# Patient Record
Sex: MALE | Race: BLACK OR AFRICAN AMERICAN | Employment: FULL TIME | URBAN - METROPOLITAN AREA
[De-identification: names, ages, dates, MRNs, and addresses within clinical notes are randomized per-mention and may not be internally consistent; named-entity substitution may affect disease eponyms.]

---

## 2017-01-06 ENCOUNTER — TELEPHONE (OUTPATIENT)
Dept: FAMILY MEDICINE CLINIC | Facility: CLINIC | Age: 57
End: 2017-01-06

## 2017-12-20 ENCOUNTER — APPOINTMENT (OUTPATIENT)
Dept: LAB | Age: 57
End: 2017-12-20
Attending: FAMILY MEDICINE
Payer: COMMERCIAL

## 2017-12-20 ENCOUNTER — OFFICE VISIT (OUTPATIENT)
Dept: FAMILY MEDICINE CLINIC | Facility: CLINIC | Age: 57
End: 2017-12-20

## 2017-12-20 VITALS
SYSTOLIC BLOOD PRESSURE: 136 MMHG | DIASTOLIC BLOOD PRESSURE: 88 MMHG | HEIGHT: 69 IN | BODY MASS INDEX: 28.44 KG/M2 | RESPIRATION RATE: 16 BRPM | TEMPERATURE: 99 F | WEIGHT: 192 LBS | HEART RATE: 78 BPM

## 2017-12-20 DIAGNOSIS — Z00.00 BLOOD TESTS FOR ROUTINE GENERAL PHYSICAL EXAMINATION: ICD-10-CM

## 2017-12-20 DIAGNOSIS — M65.332 TRIGGER MIDDLE FINGER OF LEFT HAND: ICD-10-CM

## 2017-12-20 DIAGNOSIS — N52.9 ERECTILE DYSFUNCTION, UNSPECIFIED ERECTILE DYSFUNCTION TYPE: ICD-10-CM

## 2017-12-20 DIAGNOSIS — Z12.11 SCREEN FOR COLON CANCER: ICD-10-CM

## 2017-12-20 DIAGNOSIS — Z00.00 ROUTINE GENERAL MEDICAL EXAMINATION AT A HEALTH CARE FACILITY: Primary | ICD-10-CM

## 2017-12-20 PROCEDURE — 86431 RHEUMATOID FACTOR QUANT: CPT | Performed by: FAMILY MEDICINE

## 2017-12-20 PROCEDURE — 80061 LIPID PANEL: CPT | Performed by: FAMILY MEDICINE

## 2017-12-20 PROCEDURE — 99396 PREV VISIT EST AGE 40-64: CPT | Performed by: FAMILY MEDICINE

## 2017-12-20 PROCEDURE — 36415 COLL VENOUS BLD VENIPUNCTURE: CPT | Performed by: FAMILY MEDICINE

## 2017-12-20 PROCEDURE — 80053 COMPREHEN METABOLIC PANEL: CPT | Performed by: FAMILY MEDICINE

## 2017-12-20 PROCEDURE — 84153 ASSAY OF PSA TOTAL: CPT | Performed by: FAMILY MEDICINE

## 2017-12-20 PROCEDURE — 86140 C-REACTIVE PROTEIN: CPT | Performed by: FAMILY MEDICINE

## 2017-12-20 RX ORDER — SILDENAFIL 25 MG/1
25 TABLET, FILM COATED ORAL
Qty: 8 TABLET | Refills: 12 | Status: SHIPPED | OUTPATIENT
Start: 2017-12-20 | End: 2018-08-16 | Stop reason: ALTCHOICE

## 2017-12-20 NOTE — PROGRESS NOTES
HPI:  Here for a physical.    PAST MEDICAL HISTORY:  No past medical history on file. PAST SURGICAL HISTORY:  No past surgical history on file.   MEDICATIONS:    Current Outpatient Prescriptions:  Sildenafil Citrate 20 MG Oral Tab Take 1 tablet (20 mg tota of symptoms of depression or anxiety. HEMATOLOGY: No complaints of bruising or excessive bleeding. ENDOCRINE: No complaints of excessive thirst or urination; No complaints of unexpected weight gain or weight loss.   SKIN: no new or changing moles reported Alert and orientated. SKIN: no suspicions lesions noted. PSYCHIATRIC: Mood and affect appropriate. ASSESSMENT / PLAN:  Routine health maintenance. Labs ordered: see orders.   Discussed USPTFS recommendations against prostate cancer screening as of M

## 2017-12-26 ENCOUNTER — TELEPHONE (OUTPATIENT)
Dept: FAMILY MEDICINE CLINIC | Facility: CLINIC | Age: 57
End: 2017-12-26

## 2017-12-27 RX ORDER — SILDENAFIL CITRATE 20 MG/1
TABLET ORAL
Qty: 8 TABLET | Refills: 0 | OUTPATIENT
Start: 2017-12-27

## 2018-01-02 RX ORDER — SILDENAFIL CITRATE 20 MG/1
TABLET ORAL
Qty: 8 TABLET | Refills: 0 | OUTPATIENT
Start: 2018-01-02

## 2018-01-10 NOTE — TELEPHONE ENCOUNTER
P.A. Done through CoverMyMeds. States no product available for selection. Additional info required. Hold for fax.

## 2018-01-11 NOTE — TELEPHONE ENCOUNTER
Spoke to pharmacy - tried twice to do P. A. -response was no products available, case cannot be created- denied.

## 2018-08-16 ENCOUNTER — OFFICE VISIT (OUTPATIENT)
Dept: FAMILY MEDICINE CLINIC | Facility: CLINIC | Age: 58
End: 2018-08-16
Payer: COMMERCIAL

## 2018-08-16 VITALS
WEIGHT: 189 LBS | DIASTOLIC BLOOD PRESSURE: 84 MMHG | TEMPERATURE: 98 F | OXYGEN SATURATION: 98 % | HEIGHT: 69 IN | BODY MASS INDEX: 27.99 KG/M2 | HEART RATE: 93 BPM | RESPIRATION RATE: 18 BRPM | SYSTOLIC BLOOD PRESSURE: 134 MMHG

## 2018-08-16 DIAGNOSIS — Z12.11 SCREEN FOR COLON CANCER: ICD-10-CM

## 2018-08-16 DIAGNOSIS — K22.4 ESOPHAGEAL DYSMOTILITY: Primary | ICD-10-CM

## 2018-08-16 PROCEDURE — 99213 OFFICE O/P EST LOW 20 MIN: CPT | Performed by: FAMILY MEDICINE

## 2018-08-16 NOTE — PROGRESS NOTES
Here with an episode of food getting stuck in his chest 2 weeks ago. He was eating a steak, baked potatoes, and asparagus. The food seemed to be stuck low in his chest.  Made him feel uncomfortable. He went and laid down on the couch for a while.   Event

## 2018-09-06 PROBLEM — R13.19 ESOPHAGEAL DYSPHAGIA: Status: ACTIVE | Noted: 2018-09-06

## 2018-10-17 PROBLEM — Z12.11 SPECIAL SCREENING FOR MALIGNANT NEOPLASMS, COLON: Status: ACTIVE | Noted: 2018-10-17

## 2019-01-22 ENCOUNTER — OFFICE VISIT (OUTPATIENT)
Dept: FAMILY MEDICINE CLINIC | Facility: CLINIC | Age: 59
End: 2019-01-22
Payer: COMMERCIAL

## 2019-01-22 ENCOUNTER — LABORATORY ENCOUNTER (OUTPATIENT)
Dept: LAB | Age: 59
End: 2019-01-22
Attending: FAMILY MEDICINE
Payer: COMMERCIAL

## 2019-01-22 VITALS
SYSTOLIC BLOOD PRESSURE: 120 MMHG | WEIGHT: 195 LBS | OXYGEN SATURATION: 98 % | TEMPERATURE: 98 F | DIASTOLIC BLOOD PRESSURE: 80 MMHG | BODY MASS INDEX: 27.92 KG/M2 | RESPIRATION RATE: 18 BRPM | HEIGHT: 70 IN | HEART RATE: 67 BPM

## 2019-01-22 DIAGNOSIS — N40.1 BENIGN PROSTATIC HYPERPLASIA WITH URINARY FREQUENCY: ICD-10-CM

## 2019-01-22 DIAGNOSIS — Z13.0 SCREENING, ANEMIA, DEFICIENCY, IRON: ICD-10-CM

## 2019-01-22 DIAGNOSIS — Z13.21 ENCOUNTER FOR VITAMIN DEFICIENCY SCREENING: ICD-10-CM

## 2019-01-22 DIAGNOSIS — R35.0 URINE FREQUENCY: ICD-10-CM

## 2019-01-22 DIAGNOSIS — R35.0 BENIGN PROSTATIC HYPERPLASIA WITH URINARY FREQUENCY: Primary | ICD-10-CM

## 2019-01-22 DIAGNOSIS — N40.1 BENIGN PROSTATIC HYPERPLASIA WITH URINARY FREQUENCY: Primary | ICD-10-CM

## 2019-01-22 DIAGNOSIS — Z13.220 SCREENING, LIPID: ICD-10-CM

## 2019-01-22 DIAGNOSIS — R35.0 BENIGN PROSTATIC HYPERPLASIA WITH URINARY FREQUENCY: ICD-10-CM

## 2019-01-22 LAB
ALBUMIN SERPL-MCNC: 3.8 G/DL (ref 3.1–4.5)
ALBUMIN/GLOB SERPL: 1 {RATIO} (ref 1–2)
ALP LIVER SERPL-CCNC: 66 U/L (ref 45–117)
ALT SERPL-CCNC: 59 U/L (ref 17–63)
ANION GAP SERPL CALC-SCNC: 9 MMOL/L (ref 0–18)
AST SERPL-CCNC: 29 U/L (ref 15–41)
BASOPHILS # BLD AUTO: 0.03 X10(3) UL (ref 0–0.1)
BASOPHILS NFR BLD AUTO: 0.6 %
BILIRUB SERPL-MCNC: 0.4 MG/DL (ref 0.1–2)
BUN BLD-MCNC: 21 MG/DL (ref 8–20)
BUN/CREAT SERPL: 15.3 (ref 10–20)
CALCIUM BLD-MCNC: 9.2 MG/DL (ref 8.3–10.3)
CHLORIDE SERPL-SCNC: 110 MMOL/L (ref 101–111)
CHOLEST SMN-MCNC: 244 MG/DL (ref ?–200)
CO2 SERPL-SCNC: 24 MMOL/L (ref 22–32)
CREAT BLD-MCNC: 1.37 MG/DL (ref 0.7–1.3)
EOSINOPHIL # BLD AUTO: 0.23 X10(3) UL (ref 0–0.3)
EOSINOPHIL NFR BLD AUTO: 4.7 %
ERYTHROCYTE [DISTWIDTH] IN BLOOD BY AUTOMATED COUNT: 14.8 % (ref 11.5–16)
EST. AVERAGE GLUCOSE BLD GHB EST-MCNC: 137 MG/DL (ref 68–126)
GLOBULIN PLAS-MCNC: 3.7 G/DL (ref 2.8–4.4)
GLUCOSE BLD-MCNC: 109 MG/DL (ref 70–99)
HBA1C MFR BLD HPLC: 6.4 % (ref ?–5.7)
HCT VFR BLD AUTO: 46.2 % (ref 37–53)
HDLC SERPL-MCNC: 43 MG/DL (ref 40–59)
HGB BLD-MCNC: 14.9 G/DL (ref 13–17)
IMMATURE GRANULOCYTE COUNT: 0.02 X10(3) UL (ref 0–1)
IMMATURE GRANULOCYTE RATIO %: 0.4 %
LDLC SERPL CALC-MCNC: 137 MG/DL (ref ?–100)
LYMPHOCYTES # BLD AUTO: 1.8 X10(3) UL (ref 0.9–4)
LYMPHOCYTES NFR BLD AUTO: 36.7 %
M PROTEIN MFR SERPL ELPH: 7.5 G/DL (ref 6.4–8.2)
MCH RBC QN AUTO: 26.9 PG (ref 27–33.2)
MCHC RBC AUTO-ENTMCNC: 32.3 G/DL (ref 31–37)
MCV RBC AUTO: 83.4 FL (ref 80–99)
MONOCYTES # BLD AUTO: 0.54 X10(3) UL (ref 0.1–1)
MONOCYTES NFR BLD AUTO: 11 %
NEUTROPHIL ABS PRELIM: 2.29 X10 (3) UL (ref 1.3–6.7)
NEUTROPHILS # BLD AUTO: 2.29 X10(3) UL (ref 1.3–6.7)
NEUTROPHILS NFR BLD AUTO: 46.6 %
NONHDLC SERPL-MCNC: 201 MG/DL (ref ?–130)
OSMOLALITY SERPL CALC.SUM OF ELEC: 300 MOSM/KG (ref 275–295)
PLATELET # BLD AUTO: 246 10(3)UL (ref 150–450)
POTASSIUM SERPL-SCNC: 4.2 MMOL/L (ref 3.6–5.1)
PSA SERPL-MCNC: 1.52 NG/ML (ref 0.01–4)
RBC # BLD AUTO: 5.54 X10(6)UL (ref 4.3–5.7)
RED CELL DISTRIBUTION WIDTH-SD: 45 FL (ref 35.1–46.3)
SODIUM SERPL-SCNC: 143 MMOL/L (ref 136–144)
TRIGL SERPL-MCNC: 318 MG/DL (ref 30–149)
VIT D+METAB SERPL-MCNC: 69.7 NG/ML (ref 30–100)
VLDLC SERPL CALC-MCNC: 64 MG/DL (ref 0–30)
WBC # BLD AUTO: 4.9 X10(3) UL (ref 4–13)

## 2019-01-22 PROCEDURE — 85025 COMPLETE CBC W/AUTO DIFF WBC: CPT | Performed by: FAMILY MEDICINE

## 2019-01-22 PROCEDURE — 36415 COLL VENOUS BLD VENIPUNCTURE: CPT | Performed by: FAMILY MEDICINE

## 2019-01-22 PROCEDURE — 80061 LIPID PANEL: CPT | Performed by: FAMILY MEDICINE

## 2019-01-22 PROCEDURE — 83036 HEMOGLOBIN GLYCOSYLATED A1C: CPT | Performed by: FAMILY MEDICINE

## 2019-01-22 PROCEDURE — 99213 OFFICE O/P EST LOW 20 MIN: CPT | Performed by: FAMILY MEDICINE

## 2019-01-22 PROCEDURE — 84153 ASSAY OF PSA TOTAL: CPT | Performed by: FAMILY MEDICINE

## 2019-01-22 PROCEDURE — 80053 COMPREHEN METABOLIC PANEL: CPT | Performed by: FAMILY MEDICINE

## 2019-01-22 PROCEDURE — 82306 VITAMIN D 25 HYDROXY: CPT | Performed by: FAMILY MEDICINE

## 2019-01-22 RX ORDER — TAMSULOSIN HYDROCHLORIDE 0.4 MG/1
0.4 CAPSULE ORAL DAILY
Qty: 30 CAPSULE | Refills: 11 | Status: SHIPPED | OUTPATIENT
Start: 2019-01-22 | End: 2019-04-17 | Stop reason: ALTCHOICE

## 2019-01-22 NOTE — PROGRESS NOTES
Here with 2 months of urine frequency and nocturia. Sometimes a bit of urgency but not always. No blood in the urine. No pain in the lower abdomen. No flank pain. No fevers or chills.     PAST MEDICAL HISTORY:  Past Medical History:   Diagnosis Date

## 2019-01-23 ENCOUNTER — TELEPHONE (OUTPATIENT)
Dept: FAMILY MEDICINE CLINIC | Facility: CLINIC | Age: 59
End: 2019-01-23

## 2019-01-23 NOTE — TELEPHONE ENCOUNTER
----- Message from Kendrick De La Rosa MD sent at 1/23/2019 12:11 PM CST -----  3-month sugar test consistent with new onset diabetes. This is contributing to his urine frequency. I thought his prostate gland felt a little exam enlarged on exam.  Given this finding on the labs and a normal PSA, I would like him to start metformin 500 mg daily. Follow-up for discussion of diabetes in about 1 month. This will give the medicine some time to work and see if his urine frequency improves.

## 2019-02-12 ENCOUNTER — TELEPHONE (OUTPATIENT)
Dept: FAMILY MEDICINE CLINIC | Facility: CLINIC | Age: 59
End: 2019-02-12

## 2019-02-12 ENCOUNTER — OFFICE VISIT (OUTPATIENT)
Dept: FAMILY MEDICINE CLINIC | Facility: CLINIC | Age: 59
End: 2019-02-12
Payer: COMMERCIAL

## 2019-02-12 VITALS
HEIGHT: 70 IN | DIASTOLIC BLOOD PRESSURE: 88 MMHG | OXYGEN SATURATION: 98 % | TEMPERATURE: 98 F | HEART RATE: 111 BPM | SYSTOLIC BLOOD PRESSURE: 130 MMHG | BODY MASS INDEX: 27.2 KG/M2 | WEIGHT: 190 LBS | RESPIRATION RATE: 18 BRPM

## 2019-02-12 DIAGNOSIS — N52.9 ERECTILE DYSFUNCTION, UNSPECIFIED ERECTILE DYSFUNCTION TYPE: ICD-10-CM

## 2019-02-12 DIAGNOSIS — Z00.00 ROUTINE GENERAL MEDICAL EXAMINATION AT A HEALTH CARE FACILITY: Primary | ICD-10-CM

## 2019-02-12 DIAGNOSIS — E11.9 TYPE 2 DIABETES MELLITUS WITHOUT COMPLICATION, WITHOUT LONG-TERM CURRENT USE OF INSULIN (HCC): ICD-10-CM

## 2019-02-12 PROCEDURE — 99396 PREV VISIT EST AGE 40-64: CPT | Performed by: FAMILY MEDICINE

## 2019-02-12 RX ORDER — SILDENAFIL 100 MG/1
100 TABLET, FILM COATED ORAL
Qty: 1 TABLET | Refills: 5 | Status: SHIPPED | OUTPATIENT
Start: 2019-02-12 | End: 2019-05-31

## 2019-02-12 NOTE — PROGRESS NOTES
Here for a physical.  We have recently diagnosed him with diabetes based on urine frequency and hemoglobin A1c of 6.4. He has been started on metformin 500 mg once a day. Urine frequency is improving.   He is leaving for [facility] in tomorrow for his wedd complaints of diplopia or blurred vision. EARS: No complaints of tinnitus or decreased hearing acuity. CARDIOVASCULAR: No complaints of chest pain with exertion, no PND, orthopnea, or edema. No decrease in exercise tolerance.   PULMONARY: No complaints of extremities. No JVD noted. PULMONARY: CTA bilaterally, good air exchange, no rhonchi, wheezes, or rales. No dullness to percussion. ABDOMEN: Soft, nontender, nondistended, NABS x 4 quadrants. No HSM; no masses; no bruits.    LYMPHATIC: no lymphadenopathy are quite expensive for him. Patient understood above plan and agreed to do labs within the next 30 days as well as to make all appointments for referrals if given within the next 30 days. Patient understands to contact office if unable to do so.

## 2019-02-12 NOTE — PATIENT INSTRUCTIONS
Statin med to reduce triglycerides and protect you from heart attacks and strokes    Ace inhibitor to protect your kidneys from elevated sugars and prevent kidney failure/ dialysis    Repeat labs in 3 months and followup a few days later.     One test is ur

## 2019-02-12 NOTE — TELEPHONE ENCOUNTER
Pt is requesting a copy of his A1C lab results or all the results that were discussed today with Dr. Hilda Zapien. Please call and advise.

## 2019-04-17 ENCOUNTER — APPOINTMENT (OUTPATIENT)
Dept: LAB | Age: 59
End: 2019-04-17
Attending: FAMILY MEDICINE
Payer: COMMERCIAL

## 2019-04-17 ENCOUNTER — OFFICE VISIT (OUTPATIENT)
Dept: FAMILY MEDICINE CLINIC | Facility: CLINIC | Age: 59
End: 2019-04-17
Payer: COMMERCIAL

## 2019-04-17 VITALS
TEMPERATURE: 98 F | DIASTOLIC BLOOD PRESSURE: 90 MMHG | HEIGHT: 70 IN | WEIGHT: 188 LBS | HEART RATE: 78 BPM | BODY MASS INDEX: 26.92 KG/M2 | RESPIRATION RATE: 18 BRPM | OXYGEN SATURATION: 98 % | SYSTOLIC BLOOD PRESSURE: 160 MMHG

## 2019-04-17 DIAGNOSIS — E11.9 TYPE 2 DIABETES MELLITUS WITHOUT COMPLICATION, WITHOUT LONG-TERM CURRENT USE OF INSULIN (HCC): Primary | ICD-10-CM

## 2019-04-17 DIAGNOSIS — E11.9 TYPE 2 DIABETES MELLITUS WITHOUT COMPLICATION, WITHOUT LONG-TERM CURRENT USE OF INSULIN (HCC): ICD-10-CM

## 2019-04-17 PROCEDURE — 82043 UR ALBUMIN QUANTITATIVE: CPT | Performed by: FAMILY MEDICINE

## 2019-04-17 PROCEDURE — 36415 COLL VENOUS BLD VENIPUNCTURE: CPT | Performed by: FAMILY MEDICINE

## 2019-04-17 PROCEDURE — 83036 HEMOGLOBIN GLYCOSYLATED A1C: CPT | Performed by: FAMILY MEDICINE

## 2019-04-17 PROCEDURE — 99213 OFFICE O/P EST LOW 20 MIN: CPT | Performed by: FAMILY MEDICINE

## 2019-04-17 PROCEDURE — 80061 LIPID PANEL: CPT | Performed by: FAMILY MEDICINE

## 2019-04-17 PROCEDURE — 80053 COMPREHEN METABOLIC PANEL: CPT | Performed by: FAMILY MEDICINE

## 2019-04-17 PROCEDURE — 82570 ASSAY OF URINE CREATININE: CPT | Performed by: FAMILY MEDICINE

## 2019-04-17 RX ORDER — LISINOPRIL 5 MG/1
5 TABLET ORAL DAILY
Qty: 90 TABLET | Refills: 3 | Status: SHIPPED | OUTPATIENT
Start: 2019-04-17 | End: 2019-05-17

## 2019-04-17 RX ORDER — ATORVASTATIN CALCIUM 20 MG/1
20 TABLET, FILM COATED ORAL NIGHTLY
Qty: 90 TABLET | Refills: 3 | Status: SHIPPED | OUTPATIENT
Start: 2019-04-17 | End: 2020-02-28

## 2019-04-17 NOTE — PROGRESS NOTES
Patient here to follow-up on new onset type 2 diabetes. Patient was having urine frequency. Trial of tamsulosin did not help. Laboratories revealed hemoglobin A1c of 6.4. He has been on metformin for 1 month. Urine frequency has improved.   No complain monofilament/sensation of both feet is normal.  Pulsation pedal pulse exam of both lower legs/feet is normal as well.     Assessment type 2 diabetes new onset with urine frequency, urine frequency improved with metformin    Elevated blood pressure, repeat b

## 2019-04-17 NOTE — PATIENT INSTRUCTIONS
Diet: Diabetes  Food is an important tool that you can use to control diabetes and stay healthy. Eating well-balanced meals in the correct amounts will help you control your blood glucose levels and prevent low blood sugar reactions.  It will also help yo · Avoid added salt. It can contribute to high blood pressure, which can cause heart disease. People with diabetes already have a risk of high blood pressure and heart disease. · Stay at a healthy weight.  If you need to lose weight, cut down on your portio · Fiber is found in foods such as vegetables, fruits, beans, and whole grains. Unlike other carbs, fiber isn’t digested or absorbed. So it doesn’t raise blood sugar.  In fact, fiber can help keep blood sugar from rising too fast. It also helps keep blood ch Protein helps the body build and repair muscle and other tissue. Protein has little or no effect on blood sugar. However, many foods that contain protein also contain saturated fat.  By choosing low-fat protein sources, you can get the benefits of protein w You will be asked about your overall health and any history of foot problems. You’ll also discuss your diabetes history, such as whether your blood sugar level has changed over time.  It also includes questions about sensations of pain, tingling, pins and n When you have diabetes, it’s easier to prevent problems than to treat them later on. So see your healthcare team for regular checkups and foot care. Your healthcare team can also help you learn more about caring for your feet at home.  For example, you may When you have diabetes, the glucose in your blood builds up because it cannot get into the cells. This buildup is called high blood sugar (hyperglycemia). Your blood sugar level depends on several things.  It depends on what kind of food you eat and how mu · Take good care of your feet. If you have lost feeling in your feet, you may not see an injury or infection. Check your feet and between your toes at least once a week.   · Wear a medical alert bracelet or necklace, or carry a card in your wallet that says · Drink water or other liquids that do not have caffeine or calories. This will keep you from getting dehydrated. If you are nauseated or vomiting, takes small sips every 5 minutes.  To prevent dehydration try to drink a cup (8 ounces) of fluids every hour · Chest pain or shortness of breath  · Dizziness or fainting  · Weakness of an arm or leg or one side of the face  · Trouble speaking or seeing   Date Last Reviewed: 6/1/2016  © 6507-9094 [employer].  [address] [facility] [place], [address] [place] [address]

## 2019-05-17 ENCOUNTER — OFFICE VISIT (OUTPATIENT)
Dept: FAMILY MEDICINE CLINIC | Facility: CLINIC | Age: 59
End: 2019-05-17
Payer: COMMERCIAL

## 2019-05-17 VITALS
BODY MASS INDEX: 27.43 KG/M2 | WEIGHT: 191.63 LBS | HEART RATE: 83 BPM | TEMPERATURE: 98 F | SYSTOLIC BLOOD PRESSURE: 124 MMHG | OXYGEN SATURATION: 96 % | RESPIRATION RATE: 18 BRPM | HEIGHT: 70 IN | DIASTOLIC BLOOD PRESSURE: 84 MMHG

## 2019-05-17 DIAGNOSIS — T46.4X5A ACE-INHIBITOR COUGH: Primary | ICD-10-CM

## 2019-05-17 DIAGNOSIS — R05.8 ACE-INHIBITOR COUGH: Primary | ICD-10-CM

## 2019-05-17 PROCEDURE — 99213 OFFICE O/P EST LOW 20 MIN: CPT | Performed by: FAMILY MEDICINE

## 2019-05-17 RX ORDER — BENZONATATE 100 MG/1
100 CAPSULE ORAL 3 TIMES DAILY PRN
Qty: 30 CAPSULE | Refills: 0 | Status: SHIPPED | OUTPATIENT
Start: 2019-05-17 | End: 2019-10-14 | Stop reason: ALTCHOICE

## 2019-05-17 NOTE — PATIENT INSTRUCTIONS
Stop lisinopril due to cough. Cough should resolve in another week or two. Message me next Friday with an update on cough . We may need to try a different family of bp meds in the future.

## 2019-05-17 NOTE — PROGRESS NOTES
Started on lisinopril and statin 1 month ago due to new onset diabetes. Developed a cough almost immediately. Feels like he has to clear his throat. No fevers or chills. No head congestion. No sore throat.   Rarely he gets a little bit of mucus from th are clear no crackles nor wheezes. No dullness to percussion. No E to a changes. Heart regular rate and rhythm normal S1-S2 no murmur. Assessment ACE inhibitor induced cough    Plans continue off ACE inhibitor.   I will hold off on resuming any other

## 2019-05-31 DIAGNOSIS — N52.9 ERECTILE DYSFUNCTION, UNSPECIFIED ERECTILE DYSFUNCTION TYPE: ICD-10-CM

## 2019-06-02 RX ORDER — SILDENAFIL 100 MG/1
100 TABLET, FILM COATED ORAL
Qty: 1 TABLET | Refills: 5 | Status: SHIPPED | OUTPATIENT
Start: 2019-06-02 | End: 2019-08-26

## 2019-08-26 DIAGNOSIS — N52.9 ERECTILE DYSFUNCTION, UNSPECIFIED ERECTILE DYSFUNCTION TYPE: ICD-10-CM

## 2019-08-26 RX ORDER — SILDENAFIL 100 MG/1
100 TABLET, FILM COATED ORAL
Qty: 1 TABLET | Refills: 5 | Status: SHIPPED | OUTPATIENT
Start: 2019-08-26 | End: 2021-02-18

## 2019-08-26 NOTE — TELEPHONE ENCOUNTER
Pt stated he took his last pill of the sildenafil and is also out of refills. Pt is requesting a refill. Please call and advise.

## 2019-10-14 ENCOUNTER — HOSPITAL ENCOUNTER (OUTPATIENT)
Dept: GENERAL RADIOLOGY | Age: 59
Discharge: HOME | End: 2019-10-14
Attending: FAMILY MEDICINE
Payer: COMMERCIAL

## 2019-10-14 ENCOUNTER — OFFICE VISIT (OUTPATIENT)
Dept: FAMILY MEDICINE CLINIC | Facility: CLINIC | Age: 59
End: 2019-10-14
Payer: COMMERCIAL

## 2019-10-14 VITALS
DIASTOLIC BLOOD PRESSURE: 80 MMHG | HEIGHT: 70 IN | SYSTOLIC BLOOD PRESSURE: 116 MMHG | RESPIRATION RATE: 13 BRPM | HEART RATE: 78 BPM | OXYGEN SATURATION: 97 % | BODY MASS INDEX: 27.11 KG/M2 | TEMPERATURE: 98 F | WEIGHT: 189.38 LBS

## 2019-10-14 DIAGNOSIS — R35.0 BENIGN PROSTATIC HYPERPLASIA WITH URINARY FREQUENCY: ICD-10-CM

## 2019-10-14 DIAGNOSIS — R35.0 URINE FREQUENCY: ICD-10-CM

## 2019-10-14 DIAGNOSIS — E11.9 TYPE 2 DIABETES MELLITUS WITHOUT COMPLICATION, WITHOUT LONG-TERM CURRENT USE OF INSULIN (HCC): Primary | ICD-10-CM

## 2019-10-14 DIAGNOSIS — R05.8 COUGH WITH SPUTUM: ICD-10-CM

## 2019-10-14 DIAGNOSIS — N40.1 BENIGN PROSTATIC HYPERPLASIA WITH URINARY FREQUENCY: ICD-10-CM

## 2019-10-14 PROCEDURE — 71046 X-RAY EXAM CHEST 2 VIEWS: CPT | Performed by: FAMILY MEDICINE

## 2019-10-14 PROCEDURE — 99214 OFFICE O/P EST MOD 30 MIN: CPT | Performed by: FAMILY MEDICINE

## 2019-10-14 PROCEDURE — 81003 URINALYSIS AUTO W/O SCOPE: CPT | Performed by: FAMILY MEDICINE

## 2019-10-14 RX ORDER — FINASTERIDE 5 MG/1
5 TABLET, FILM COATED ORAL DAILY
Qty: 90 TABLET | Refills: 0 | Status: SHIPPED | OUTPATIENT
Start: 2019-10-14 | End: 2019-11-25 | Stop reason: DRUGHIGH

## 2019-10-14 NOTE — PROGRESS NOTES
Here with cough going on the better part of a month. Productive of white mucus without blood. No chest pain or back pain. No fevers or chills. No head congestion. No new pets in the house. They did put new carpeting down over the summer.     Due for d Abdomen soft nontender positive bowel sounds no suprapubic tenderness no inguinal lymphadenopathy. Back no CVA tenderness. Extremities 2+ pulses and no edema. Urine Analysis normal except for elevated specific gravity.     Assessment #1 type 2 diabetes

## 2019-10-14 NOTE — PATIENT INSTRUCTIONS
Use Proscar for 90 days. This will help to shrink the prostate gland. Then follow-up with me to discuss the urine.

## 2019-11-25 ENCOUNTER — TELEPHONE (OUTPATIENT)
Dept: FAMILY MEDICINE CLINIC | Facility: CLINIC | Age: 59
End: 2019-11-25

## 2019-11-25 RX ORDER — FINASTERIDE 5 MG/1
10 TABLET, FILM COATED ORAL DAILY
Qty: 60 TABLET | Refills: 0 | Status: SHIPPED | OUTPATIENT
Start: 2019-11-25 | End: 2020-02-28 | Stop reason: ALTCHOICE

## 2019-11-25 NOTE — TELEPHONE ENCOUNTER
Pt stated he was prescribed medication for his frequent urination, pt finished the entire bottle and the med did not help, pt is still having frequent urination. Should pt start a new med? Please call and advise. Pt asked to call him at his cell phone.

## 2019-11-25 NOTE — TELEPHONE ENCOUNTER
Pt was given finasteride 5mg for freq urination,  It has not helped him. Is there something else or higher dose?

## 2020-02-28 ENCOUNTER — OFFICE VISIT (OUTPATIENT)
Dept: FAMILY MEDICINE CLINIC | Facility: CLINIC | Age: 60
End: 2020-02-28
Payer: COMMERCIAL

## 2020-02-28 VITALS
WEIGHT: 194.19 LBS | TEMPERATURE: 98 F | BODY MASS INDEX: 27.8 KG/M2 | SYSTOLIC BLOOD PRESSURE: 136 MMHG | DIASTOLIC BLOOD PRESSURE: 78 MMHG | HEIGHT: 70 IN | HEART RATE: 76 BPM

## 2020-02-28 DIAGNOSIS — E11.9 TYPE 2 DIABETES MELLITUS WITHOUT COMPLICATION, WITHOUT LONG-TERM CURRENT USE OF INSULIN (HCC): Primary | ICD-10-CM

## 2020-02-28 PROBLEM — Z12.11 SPECIAL SCREENING FOR MALIGNANT NEOPLASMS, COLON: Status: RESOLVED | Noted: 2018-10-17 | Resolved: 2020-02-28

## 2020-02-28 PROCEDURE — 99213 OFFICE O/P EST LOW 20 MIN: CPT | Performed by: FAMILY MEDICINE

## 2020-02-28 NOTE — PROGRESS NOTES
Here to follow-up on type 2 diabetes. He stopped all the medications 1 month ago. He was having acid heartburn as well as diarrhea. He has been doing better off of the medications as far as those symptoms are concerned.   He felt that there were just too well.    Assessment type 2 diabetes off medications for 1 month. Plan we discussed that his A1c was 6.4 last year when he was symptomatic with urine frequency. Some patients are able to control her diabetes with diet and exercise.   The side effects he

## 2020-02-28 NOTE — PATIENT INSTRUCTIONS
Metformin caused gi side effects. All diabetes should be on cholesterol meds  Repeat labs in late April, off meds for 3 total months. If urine frequency returns, that is your clue that sugar is going up.   Weight goal 175:  BMI 25

## 2020-04-27 DIAGNOSIS — E78.00 PURE HYPERCHOLESTEROLEMIA: ICD-10-CM

## 2020-04-27 DIAGNOSIS — E11.9 TYPE 2 DIABETES MELLITUS WITHOUT COMPLICATION, WITHOUT LONG-TERM CURRENT USE OF INSULIN (HCC): Primary | ICD-10-CM

## 2020-04-27 RX ORDER — ATORVASTATIN CALCIUM 20 MG/1
20 TABLET, FILM COATED ORAL NIGHTLY
Qty: 90 TABLET | Refills: 3 | Status: SHIPPED | OUTPATIENT
Start: 2020-04-27 | End: 2020-06-17

## 2020-06-08 ENCOUNTER — OFFICE VISIT (OUTPATIENT)
Dept: FAMILY MEDICINE CLINIC | Facility: CLINIC | Age: 60
End: 2020-06-08
Payer: COMMERCIAL

## 2020-06-08 VITALS
SYSTOLIC BLOOD PRESSURE: 142 MMHG | HEART RATE: 97 BPM | TEMPERATURE: 97 F | RESPIRATION RATE: 18 BRPM | DIASTOLIC BLOOD PRESSURE: 72 MMHG | HEIGHT: 68.5 IN | WEIGHT: 194 LBS | OXYGEN SATURATION: 98 % | BODY MASS INDEX: 29.07 KG/M2

## 2020-06-08 DIAGNOSIS — R35.0 BENIGN PROSTATIC HYPERPLASIA WITH URINARY FREQUENCY: ICD-10-CM

## 2020-06-08 DIAGNOSIS — E78.00 PURE HYPERCHOLESTEROLEMIA: ICD-10-CM

## 2020-06-08 DIAGNOSIS — E11.9 TYPE 2 DIABETES MELLITUS WITHOUT COMPLICATION, WITHOUT LONG-TERM CURRENT USE OF INSULIN (HCC): Primary | ICD-10-CM

## 2020-06-08 DIAGNOSIS — N40.1 BENIGN PROSTATIC HYPERPLASIA WITH URINARY FREQUENCY: ICD-10-CM

## 2020-06-08 PROCEDURE — 99396 PREV VISIT EST AGE 40-64: CPT | Performed by: FAMILY MEDICINE

## 2020-06-08 RX ORDER — TERAZOSIN 2 MG/1
2 CAPSULE ORAL NIGHTLY
Qty: 90 CAPSULE | Refills: 3 | Status: SHIPPED | OUTPATIENT
Start: 2020-06-08 | End: 2020-09-15 | Stop reason: ALTCHOICE

## 2020-06-08 RX ORDER — LEVOTHYROXINE, LIOTHYRONINE 38; 9 UG/1; UG/1
TABLET ORAL
COMMUNITY
Start: 2020-04-21

## 2020-06-08 NOTE — PROGRESS NOTES
HPI:  Here for a physical.    PAST MEDICAL HISTORY:  Past Medical History:   Diagnosis Date   • Decorative tattoo    • Type 2 diabetes mellitus without complication, without long-term current use of insulin ([facility] 2/12/2019   • Wears glasses      PAST SURGI complaints of dysphgia or any GERD symptoms. Denies hematochezia and melena. No complaints of any new constipation or diarrhea. No complaints of abdominal pain or cramping. Regular stools. GENITOURINARY: Continued urine frequency.   His sugars are now und no hernias. Penis shaft and glans without lesions, no discharge. LYMPHATIC: no lymphadenopathy palpated about the anterior and posterior cervical chains, submandibular and supraclavicular areas, axilla, and inguinal areas.   EXTREMITIES / MUSCULOSKELETAL:

## 2020-06-17 ENCOUNTER — TELEPHONE (OUTPATIENT)
Dept: FAMILY MEDICINE CLINIC | Facility: CLINIC | Age: 60
End: 2020-06-17

## 2020-06-17 RX ORDER — ROSUVASTATIN CALCIUM 10 MG/1
10 TABLET, COATED ORAL NIGHTLY
Qty: 90 TABLET | Refills: 1 | Status: SHIPPED | OUTPATIENT
Start: 2020-06-17 | End: 2021-07-22 | Stop reason: ALTCHOICE

## 2020-06-17 NOTE — TELEPHONE ENCOUNTER
alll statins may cause this. Have him hold one month. Then try crestor 10mg daily. If myalgias recur, stop immediately and call us.

## 2020-06-17 NOTE — TELEPHONE ENCOUNTER
Please review previous message. Pt states since starting Atorvastatin 4/27/2020 after taking med he feels generalized joint aches and chest discomfort. Pt states these sx occur 30 minutes after taking med. Pt states he takes med at night and usually sx disapp

## 2020-06-17 NOTE — TELEPHONE ENCOUNTER
Pt states he stopped taking his atorvastatin because he felt that it was giving him chest pain    Pt started taking it at the end of April    He did not take it the last 2 nights and states he was not getting the chest pain    Please advise

## 2020-07-19 LAB
ALBUMIN/GLOBULIN RATIO: 1.5 (CALC) (ref 1–2.5)
ALBUMIN: 4.2 G/DL (ref 3.6–5.1)
ALKALINE PHOSPHATASE: 55 U/L (ref 35–144)
ALT: 23 U/L (ref 9–46)
AST: 19 U/L (ref 10–35)
BILIRUBIN, TOTAL: 0.6 MG/DL (ref 0.2–1.2)
BUN/CREATININE RATIO: 12 (CALC) (ref 6–22)
BUN: 18 MG/DL (ref 7–25)
CALCIUM: 9.3 MG/DL (ref 8.6–10.3)
CARBON DIOXIDE: 24 MMOL/L (ref 20–32)
CHLORIDE: 103 MMOL/L (ref 98–110)
CHOL/HDLC RATIO: 3 (CALC)
CHOLESTEROL, TOTAL: 149 MG/DL
CREATININE: 1.51 MG/DL (ref 0.7–1.25)
EGFR IF AFRICN AM: 57 ML/MIN/1.73M2
EGFR IF NONAFRICN AM: 49 ML/MIN/1.73M2
GLOBULIN: 2.8 G/DL (CALC) (ref 1.9–3.7)
GLUCOSE: 95 MG/DL (ref 65–99)
HDL CHOLESTEROL: 49 MG/DL
HEMOGLOBIN A1C: 5.3 % OF TOTAL HGB
LDL-CHOLESTEROL: 81 MG/DL (CALC)
NON-HDL CHOLESTEROL: 100 MG/DL (CALC)
POTASSIUM: 4.6 MMOL/L (ref 3.5–5.3)
PROTEIN, TOTAL: 7 G/DL (ref 6.1–8.1)
SODIUM: 135 MMOL/L (ref 135–146)
TRIGLYCERIDES: 101 MG/DL

## 2020-09-15 ENCOUNTER — OFFICE VISIT (OUTPATIENT)
Dept: SURGERY | Facility: CLINIC | Age: 60
End: 2020-09-15
Payer: COMMERCIAL

## 2020-09-15 DIAGNOSIS — R35.0 FREQUENCY OF MICTURITION: ICD-10-CM

## 2020-09-15 DIAGNOSIS — R33.9 INCOMPLETE BLADDER EMPTYING: ICD-10-CM

## 2020-09-15 DIAGNOSIS — R82.90 URINE FINDING: Primary | ICD-10-CM

## 2020-09-15 DIAGNOSIS — N40.0 ENLARGED PROSTATE: ICD-10-CM

## 2020-09-15 LAB
APPEARANCE: CLEAR
MULTISTIX LOT#: 1044 NUMERIC
PH, URINE: 7 (ref 4.5–8)
SPECIFIC GRAVITY: 1.02 (ref 1–1.03)
URINE-COLOR: YELLOW
UROBILINOGEN,SEMI-QN: 0.2 MG/DL (ref 0–1.9)

## 2020-09-15 PROCEDURE — 81003 URINALYSIS AUTO W/O SCOPE: CPT | Performed by: UROLOGY

## 2020-09-15 PROCEDURE — 99203 OFFICE O/P NEW LOW 30 MIN: CPT | Performed by: UROLOGY

## 2020-09-15 PROCEDURE — 76857 US EXAM PELVIC LIMITED: CPT | Performed by: UROLOGY

## 2020-09-15 RX ORDER — TAMSULOSIN HYDROCHLORIDE 0.4 MG/1
0.4 CAPSULE ORAL DAILY
Qty: 30 CAPSULE | Refills: 3 | Status: SHIPPED | OUTPATIENT
Start: 2020-09-15 | End: 2020-10-15

## 2020-09-15 RX ORDER — SILDENAFIL 100 MG/1
100 TABLET, FILM COATED ORAL
Qty: 15 TABLET | Refills: 5 | Status: SHIPPED | OUTPATIENT
Start: 2020-09-15 | End: 2021-04-13

## 2020-09-15 NOTE — PATIENT INSTRUCTIONS
Tamsulosin capsules  Brand Name: Flomax  What is this medicine? TAMSULOSIN (mejía FILOMENA lida sin) is an alpha blocker. It is used to treat the signs and symptoms of an enlarged prostate in men.  This condition is also called benign prostatic hyperplasia (BPH) If you miss a dose, take it as soon as you can. If it is almost time for your next dose, take only that dose. Do not take double or extra doses.  If you stop taking your medicine for several days or more, ask your doctor or health care professional what [facility] Contact your doctor or health care professional right away if you have an erection that lasts longer than 4 hours or if it becomes painful. This may be a sign of a serious problem and must be treated right away to prevent permanent damage.   If you are thin · nitrates like amyl nitrite, isosorbide dinitrate, isosorbide mononitrate, nitroglycerin  · riociguat  This medicine may also interact with the following medications:  · antiviral medicines for HIV or AIDS  · bosentan  · certain medicines for benign prost Contact your doctor or health care professional right away if you have an erection that lasts longer than 4 hours or if it becomes painful. This may be a sign of a serious problem and must be treated right away to prevent permanent damage.   If you experien

## 2020-09-15 NOTE — PROGRESS NOTES
Rooming Clinician:     Brodie Avila is a 56year old male.   Patient presents with:  Consult: c/o frequent urination, ED  Miscellaneous Urology:  Chief Complaint: Patient presents with:  Consult: c/o frequent urination, ED     Frequent urination: Never Smoker      Smokeless tobacco: Never Used    Alcohol use:  Yes      Alcohol/week: 3.0 standard drinks      Types: 3 Cans of beer per week    Drug use: No       REVIEW OF SYSTEMS:     GENERAL HEALTH: feels well otherwise  SKIN: denies any unusual skin surgical procedures such as alpha blockers, 5 DHD inhibitors, and various surgical procedures including TURP, photo vaporization of the prostate, open surgery, robotic suprapubic prostatectomy, vapoenucleation of the prostate as well as various interstitia these issues and agrees to the plan. Sudhakar Beltre M.D.   Urology

## 2020-09-16 LAB — NON GYNE INTERPRETATION: NEGATIVE

## 2020-09-22 ENCOUNTER — TELEPHONE (OUTPATIENT)
Dept: FAMILY MEDICINE CLINIC | Facility: CLINIC | Age: 60
End: 2020-09-22

## 2020-09-22 NOTE — TELEPHONE ENCOUNTER
It is 3 months and time for pt to get his blood drawn for cholesterol. If anyother blood work is put in please inform pt.  Make sure they are [facility] out for [facility].

## 2020-09-22 NOTE — TELEPHONE ENCOUNTER
On July 18, 2020 his A1c was 5.3 and liver and kidney functions were normal.  Cholesterol looked great. The notes suggest he repeat labs in 6 months not two.   Labs are not due until January 2021

## 2020-10-20 ENCOUNTER — OFFICE VISIT (OUTPATIENT)
Dept: SURGERY | Facility: CLINIC | Age: 60
End: 2020-10-20
Payer: COMMERCIAL

## 2020-10-20 VITALS — DIASTOLIC BLOOD PRESSURE: 99 MMHG | HEART RATE: 78 BPM | SYSTOLIC BLOOD PRESSURE: 167 MMHG | TEMPERATURE: 98 F

## 2020-10-20 DIAGNOSIS — N13.8 BPH WITH OBSTRUCTION/LOWER URINARY TRACT SYMPTOMS: ICD-10-CM

## 2020-10-20 DIAGNOSIS — N40.1 BPH WITH OBSTRUCTION/LOWER URINARY TRACT SYMPTOMS: ICD-10-CM

## 2020-10-20 DIAGNOSIS — R82.90 URINE FINDING: Primary | ICD-10-CM

## 2020-10-20 PROCEDURE — 99213 OFFICE O/P EST LOW 20 MIN: CPT | Performed by: UROLOGY

## 2020-10-20 PROCEDURE — 3077F SYST BP >= 140 MM HG: CPT | Performed by: UROLOGY

## 2020-10-20 PROCEDURE — 3080F DIAST BP >= 90 MM HG: CPT | Performed by: UROLOGY

## 2020-10-20 NOTE — PROGRESS NOTES
Rooming Clinician:     Kari Osler is a 56year old male. Patient presents with: Follow - Up: frequency improving  Urinary Symptoms        HPI:     Patient is doing better with improved stream on tamsulosin. Generally satisfied.   Voiding diary HPI  NEURO: no sensory or motor complaint    EXAM:     BP (!) 167/99 (BP Location: Right arm, Patient Position: Sitting, Cuff Size: large)   Pulse 78   Temp 98.2 °F (36.8 °C) (Temporal)   GENERAL: well developed, well nourished,in no apparent distress  SKI finding  -     URINALYSIS, AUTO, W/O SCOPE        Follow up examination in 1 year if PSA is normal    The patient indicates understanding of these issues and agrees to the plan. Yosi Ruiz M.D.   Urology

## 2021-02-18 ENCOUNTER — OFFICE VISIT (OUTPATIENT)
Dept: FAMILY MEDICINE CLINIC | Facility: CLINIC | Age: 61
End: 2021-02-18
Payer: COMMERCIAL

## 2021-02-18 VITALS
WEIGHT: 194 LBS | OXYGEN SATURATION: 98 % | HEIGHT: 68.75 IN | HEART RATE: 83 BPM | RESPIRATION RATE: 16 BRPM | SYSTOLIC BLOOD PRESSURE: 146 MMHG | DIASTOLIC BLOOD PRESSURE: 82 MMHG | BODY MASS INDEX: 28.73 KG/M2

## 2021-02-18 DIAGNOSIS — R53.83 FATIGUE, UNSPECIFIED TYPE: ICD-10-CM

## 2021-02-18 DIAGNOSIS — Z23 NEED FOR DIPHTHERIA-TETANUS-PERTUSSIS (TDAP) VACCINE: ICD-10-CM

## 2021-02-18 DIAGNOSIS — E11.9 TYPE 2 DIABETES MELLITUS WITHOUT COMPLICATION, WITHOUT LONG-TERM CURRENT USE OF INSULIN (HCC): ICD-10-CM

## 2021-02-18 DIAGNOSIS — G56.12 MEDIAN NERVE DYSFUNCTION, LEFT: Primary | ICD-10-CM

## 2021-02-18 DIAGNOSIS — E78.00 PURE HYPERCHOLESTEROLEMIA: ICD-10-CM

## 2021-02-18 PROCEDURE — 99214 OFFICE O/P EST MOD 30 MIN: CPT | Performed by: FAMILY MEDICINE

## 2021-02-18 PROCEDURE — 90715 TDAP VACCINE 7 YRS/> IM: CPT | Performed by: FAMILY MEDICINE

## 2021-02-18 PROCEDURE — 3077F SYST BP >= 140 MM HG: CPT | Performed by: FAMILY MEDICINE

## 2021-02-18 PROCEDURE — 3079F DIAST BP 80-89 MM HG: CPT | Performed by: FAMILY MEDICINE

## 2021-02-18 PROCEDURE — 90471 IMMUNIZATION ADMIN: CPT | Performed by: FAMILY MEDICINE

## 2021-02-18 PROCEDURE — 3008F BODY MASS INDEX DOCD: CPT | Performed by: FAMILY MEDICINE

## 2021-02-18 PROCEDURE — 96372 THER/PROPH/DIAG INJ SC/IM: CPT | Performed by: FAMILY MEDICINE

## 2021-02-18 RX ORDER — CYCLOBENZAPRINE HCL 5 MG
5 TABLET ORAL 3 TIMES DAILY
Qty: 15 TABLET | Refills: 0 | Status: SHIPPED | OUTPATIENT
Start: 2021-02-18 | End: 2021-02-23

## 2021-02-18 RX ORDER — CYANOCOBALAMIN 1000 UG/ML
1000 INJECTION INTRAMUSCULAR; SUBCUTANEOUS ONCE
Status: DISCONTINUED | OUTPATIENT
Start: 2021-02-18 | End: 2021-02-18

## 2021-02-18 RX ORDER — CYANOCOBALAMIN 1000 UG/ML
1000 INJECTION INTRAMUSCULAR; SUBCUTANEOUS ONCE
Status: COMPLETED | OUTPATIENT
Start: 2021-02-18 | End: 2021-02-18

## 2021-02-18 RX ORDER — NAPROXEN 500 MG/1
500 TABLET ORAL 2 TIMES DAILY
Qty: 28 TABLET | Refills: 0 | Status: SHIPPED | OUTPATIENT
Start: 2021-02-18 | End: 2021-07-22 | Stop reason: ALTCHOICE

## 2021-02-18 RX ADMIN — CYANOCOBALAMIN 1000 MCG: 1000 INJECTION INTRAMUSCULAR; SUBCUTANEOUS at 18:39:00

## 2021-02-19 NOTE — PROGRESS NOTES
A few months of tingling in the first second and third fingers of the left hand associated with pain in the upper arm. No neck pain. No weakness. 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 Lots of rotation in the shoulder.   He tried ibuprofen a few times Fingers inspect and palpate normally. No swelling redness or warmth. Light touch is equal right to left. Reflexes are positive in the upper arms. ASSESSMENT/PLAN:    1. Median nerve dysfunction, left    - cyclobenzaprine 5 MG Oral Tab;  Take 1 table

## 2021-02-20 PROCEDURE — 3044F HG A1C LEVEL LT 7.0%: CPT | Performed by: FAMILY MEDICINE

## 2021-02-20 PROCEDURE — 3061F NEG MICROALBUMINURIA REV: CPT | Performed by: FAMILY MEDICINE

## 2021-02-21 LAB
ALBUMIN/GLOBULIN RATIO: 1.5 (CALC) (ref 1–2.5)
ALBUMIN: 4.4 G/DL (ref 3.6–5.1)
ALKALINE PHOSPHATASE: 64 U/L (ref 35–144)
ALT: 29 U/L (ref 9–46)
AST: 23 U/L (ref 10–35)
BILIRUBIN, TOTAL: 0.6 MG/DL (ref 0.2–1.2)
BUN/CREATININE RATIO: 15 (CALC) (ref 6–22)
BUN: 20 MG/DL (ref 7–25)
CALCIUM: 9.6 MG/DL (ref 8.6–10.3)
CARBON DIOXIDE: 23 MMOL/L (ref 20–32)
CHLORIDE: 106 MMOL/L (ref 98–110)
CHOL/HDLC RATIO: 4.8 (CALC)
CHOLESTEROL, TOTAL: 232 MG/DL
CREATININE, RANDOM URINE: 292 MG/DL (ref 20–320)
CREATININE: 1.35 MG/DL (ref 0.7–1.25)
EGFR IF AFRICN AM: 65 ML/MIN/1.73M2
EGFR IF NONAFRICN AM: 56 ML/MIN/1.73M2
GLOBULIN: 2.9 G/DL (CALC) (ref 1.9–3.7)
GLUCOSE: 98 MG/DL (ref 65–99)
HDL CHOLESTEROL: 48 MG/DL
HEMOGLOBIN A1C: 5.6 % OF TOTAL HGB
LDL-CHOLESTEROL: 153 MG/DL (CALC)
MICROALBUMIN/CREATININE RATIO, RANDOM URINE: 4 MCG/MG CREAT
MICROALBUMIN: 1.1 MG/DL
NON-HDL CHOLESTEROL: 184 MG/DL (CALC)
POTASSIUM: 4.4 MMOL/L (ref 3.5–5.3)
PROTEIN, TOTAL: 7.3 G/DL (ref 6.1–8.1)
SODIUM: 138 MMOL/L (ref 135–146)
TRIGLYCERIDES: 179 MG/DL
VITAMIN B12: 1769 PG/ML (ref 200–1100)

## 2021-02-25 ENCOUNTER — TELEPHONE (OUTPATIENT)
Dept: FAMILY MEDICINE CLINIC | Facility: CLINIC | Age: 61
End: 2021-02-25

## 2021-02-25 DIAGNOSIS — G56.12 MEDIAN NERVE DYSFUNCTION, LEFT: Primary | ICD-10-CM

## 2021-02-25 NOTE — TELEPHONE ENCOUNTER
EMG ordered. Pt advised on tylenol or naproxen if tolerable. Pt agrees with plan. Will have test done and then go from there.

## 2021-02-25 NOTE — TELEPHONE ENCOUNTER
See update, pt on Naproxen. 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 Arm is better when he takes meds and rests it. Do you want him to have EMG done per your notes? Is there something he can take at work for pain?

## 2021-02-25 NOTE — TELEPHONE ENCOUNTER
Pt calling to let Dr. Percy Gonzalez know that when he takes the medication and is relaxing his arm feels better.   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

## 2021-02-25 NOTE — TELEPHONE ENCOUNTER
Proceed with electromyelogram.  Could try plain Tylenol at work. Naproxen is not usually sedating but certainly should avoid it while driving if it is making him drowsy.

## 2021-03-02 ENCOUNTER — LAB ENCOUNTER (OUTPATIENT)
Dept: LAB | Age: 61
End: 2021-03-02
Attending: FAMILY MEDICINE
Payer: COMMERCIAL

## 2021-04-06 ENCOUNTER — OFFICE VISIT (OUTPATIENT)
Dept: ELECTROPHYSIOLOGY | Facility: HOSPITAL | Age: 61
End: 2021-04-06
Attending: FAMILY MEDICINE
Payer: COMMERCIAL

## 2021-04-06 DIAGNOSIS — G56.12 MEDIAN NERVE DYSFUNCTION, LEFT: ICD-10-CM

## 2021-04-06 NOTE — PROCEDURES
[facility]  Nerve Conduction & EMG Report                      Renzo Esparza, [address]   EMG department   [facility] [address]  Mariana Jean [facility]  [phone]          Patient name: Laurie Elizabeth  Date of

## 2021-04-07 ENCOUNTER — TELEPHONE (OUTPATIENT)
Dept: FAMILY MEDICINE CLINIC | Facility: CLINIC | Age: 61
End: 2021-04-07

## 2021-04-09 ENCOUNTER — PATIENT MESSAGE (OUTPATIENT)
Dept: FAMILY MEDICINE CLINIC | Facility: CLINIC | Age: 61
End: 2021-04-09

## 2021-04-09 NOTE — TELEPHONE ENCOUNTER
Pt wants to speak with nurse again. He has some questions about some neck findings which were discussed and he needs further explanation.

## 2021-04-15 RX ORDER — SILDENAFIL 100 MG/1
100 TABLET, FILM COATED ORAL
Qty: 15 TABLET | Refills: 5 | Status: SHIPPED | OUTPATIENT
Start: 2021-04-15 | End: 2021-10-19

## 2021-04-15 NOTE — TELEPHONE ENCOUNTER
Received refill request for sildenafil 100 mg tablets  Patient's LOV within 12 months  Not on nitrates  Medication meets refill criteria protocol, med approved    Erectile dysfunction medications    Protocol Criteria:  • Appointment scheduled in the past 1

## 2021-04-16 NOTE — TELEPHONE ENCOUNTER
Spoke with patient:   He states he is seeing Dr. Mark Yan on :  5/3/2021 3:00 PM   Appointment  Advised patient to discuss neck findings on EMG with Dr. Mark Yan, then let us know as [facility] wants to wait for neck imaging until after ortho appt, see result note on EM

## 2021-04-20 ENCOUNTER — OFFICE VISIT (OUTPATIENT)
Dept: SURGERY | Facility: CLINIC | Age: 61
End: 2021-04-20
Payer: COMMERCIAL

## 2021-04-20 VITALS — SYSTOLIC BLOOD PRESSURE: 158 MMHG | HEART RATE: 88 BPM | DIASTOLIC BLOOD PRESSURE: 91 MMHG | TEMPERATURE: 97 F

## 2021-04-20 DIAGNOSIS — R39.9 LOWER URINARY TRACT SYMPTOMS: ICD-10-CM

## 2021-04-20 DIAGNOSIS — N40.1 BENIGN PROSTATIC HYPERPLASIA WITH URINARY FREQUENCY: ICD-10-CM

## 2021-04-20 DIAGNOSIS — R31.29 MICROHEMATURIA: Primary | ICD-10-CM

## 2021-04-20 DIAGNOSIS — R35.0 BENIGN PROSTATIC HYPERPLASIA WITH URINARY FREQUENCY: ICD-10-CM

## 2021-04-20 PROCEDURE — 3077F SYST BP >= 140 MM HG: CPT | Performed by: UROLOGY

## 2021-04-20 PROCEDURE — 99213 OFFICE O/P EST LOW 20 MIN: CPT | Performed by: UROLOGY

## 2021-04-20 PROCEDURE — 3080F DIAST BP >= 90 MM HG: CPT | Performed by: UROLOGY

## 2021-04-20 PROCEDURE — 81003 URINALYSIS AUTO W/O SCOPE: CPT | Performed by: UROLOGY

## 2021-04-20 RX ORDER — TADALAFIL 5 MG/1
TABLET ORAL
Qty: 30 TABLET | Refills: 11 | Status: SHIPPED | OUTPATIENT
Start: 2021-04-20

## 2021-04-20 RX ORDER — TAMSULOSIN HYDROCHLORIDE 0.4 MG/1
CAPSULE ORAL
COMMUNITY
Start: 2020-10-24 | End: 2021-07-22 | Stop reason: ALTCHOICE

## 2021-04-20 NOTE — PATIENT INSTRUCTIONS
On behalf of myself and care team, I would like to thank you for entrusting us with your care today. It is our goal to provide you and your family with excellent care.   Please note that you may receive a survey in the mail; any feedback you have for this effects that you should report to your doctor or health care professional as soon as possible:  · allergic reactions like skin rash, itching or hives, swelling of the face, lips, or tongue  · breathing problems  · changes in hearing  · changes in vision  · medicine after the expiration date. What should I tell my health care provider before I take this medicine?   They need to know if you have any of these conditions:  · bleeding disorders  · eye or vision problems, including a rare inherited eye disease zoila virus that causes AIDS) or other sexually transmitted diseases. NOTE:This sheet is a summary. It may not cover all possible information. If you have questions about this medicine, talk to your doctor, pharmacist, or health care provider.  Copyright© 2020 E

## 2021-04-20 NOTE — PROGRESS NOTES
Rooming Clinician:     Damian Delacruz is a 59year old male. Patient presents with: Follow - Up: 6 month follow up        HPI:     No significant improvement with tamsulosin. Patient finished prescription and discontinued.   Basically satisfied at Location: Right arm, Patient Position: Sitting, Cuff Size: large)   Pulse 88   Temp 97.2 °F (36.2 °C) (Temporal)   GENERAL: well developed, well nourished,in no apparent distress  SKIN: no rashes,no suspicious lesions  HEENT: atraumatic, normocephalic,ears

## 2021-05-03 ENCOUNTER — OFFICE VISIT (OUTPATIENT)
Dept: ORTHOPEDICS CLINIC | Facility: CLINIC | Age: 61
End: 2021-05-03
Payer: COMMERCIAL

## 2021-05-03 VITALS — HEART RATE: 88 BPM | OXYGEN SATURATION: 99 %

## 2021-05-03 DIAGNOSIS — G56.12 MEDIAN NERVE DYSFUNCTION, LEFT: Primary | ICD-10-CM

## 2021-05-03 PROCEDURE — L3908 WHO COCK-UP NONMOLDE PRE OTS: HCPCS | Performed by: ORTHOPAEDIC SURGERY

## 2021-05-03 PROCEDURE — 99203 OFFICE O/P NEW LOW 30 MIN: CPT | Performed by: ORTHOPAEDIC SURGERY

## 2021-05-03 NOTE — H&P
EMG Ortho Clinic Note    CC: Left carpal tunnel syndrome    HPI: This 59year old  male presents with numbness and tingling in the median nerve distribution. This started a few months ago.   Patient was also having left shoulder arm pain that has recently per week      Drug use: No      Sexual activity: Not on file       ROS:  Review of Systems   Constitutional: Negative for chills, fatigue and fever. HENT: Negative for congestion, dental problem and trouble swallowing.     Eyes: Negative for photophobia, with APB testing. No first dorsal interosseous weakness. Negative elbow flexion test.  Negative Tinel's at the elbow. Symmetric wrist extension flexion and radial and ulnar deviation.     EMG/NCV:   Left C8 cervical radiculopathy and moderate left carpal

## 2021-07-22 ENCOUNTER — OFFICE VISIT (OUTPATIENT)
Dept: FAMILY MEDICINE CLINIC | Facility: CLINIC | Age: 61
End: 2021-07-22
Payer: COMMERCIAL

## 2021-07-22 VITALS
BODY MASS INDEX: 26.92 KG/M2 | HEIGHT: 70 IN | HEART RATE: 82 BPM | DIASTOLIC BLOOD PRESSURE: 86 MMHG | RESPIRATION RATE: 20 BRPM | OXYGEN SATURATION: 96 % | SYSTOLIC BLOOD PRESSURE: 134 MMHG | WEIGHT: 188 LBS

## 2021-07-22 DIAGNOSIS — R35.0 BENIGN PROSTATIC HYPERPLASIA WITH URINARY FREQUENCY: ICD-10-CM

## 2021-07-22 DIAGNOSIS — E03.9 HYPOTHYROIDISM, UNSPECIFIED TYPE: ICD-10-CM

## 2021-07-22 DIAGNOSIS — E11.9 TYPE 2 DIABETES MELLITUS WITHOUT COMPLICATION, WITHOUT LONG-TERM CURRENT USE OF INSULIN (HCC): ICD-10-CM

## 2021-07-22 DIAGNOSIS — N40.1 BENIGN PROSTATIC HYPERPLASIA WITH URINARY FREQUENCY: ICD-10-CM

## 2021-07-22 DIAGNOSIS — Z00.00 ROUTINE GENERAL MEDICAL EXAMINATION AT A HEALTH CARE FACILITY: Primary | ICD-10-CM

## 2021-07-22 PROCEDURE — 3008F BODY MASS INDEX DOCD: CPT | Performed by: FAMILY MEDICINE

## 2021-07-22 PROCEDURE — 3075F SYST BP GE 130 - 139MM HG: CPT | Performed by: FAMILY MEDICINE

## 2021-07-22 PROCEDURE — 99396 PREV VISIT EST AGE 40-64: CPT | Performed by: FAMILY MEDICINE

## 2021-07-22 PROCEDURE — 3079F DIAST BP 80-89 MM HG: CPT | Performed by: FAMILY MEDICINE

## 2021-07-22 NOTE — PROGRESS NOTES
HPI:  Here for a physical.    PAST MEDICAL HISTORY:  Past Medical History:   Diagnosis Date   • Decorative tattoo    • Type 2 diabetes mellitus without complication, without long-term current use of insulin [facility] 2/12/2019   • Wears glasses      PAST SURGI complaints of dysphgia or any GERD symptoms. Denies hematochezia and melena. No complaints of any new constipation or diarrhea. No complaints of abdominal pain or cramping. Regular stools.   GENITOURINARY: No complaints of dysuria, urgency or frequency, no inguinal areas. EXTREMITIES / MUSCULOSKELETAL: 4 extremities with grossly normal ROM. Gait NL. No cyanosis, clubbing or edema. NEUROLOGIC: CN's II-XII grossly intact, DTR's 2+/4+ throughout. Strength 5+/5+ x 4 ext. Alert and orientated.   SKIN: no suspici

## 2021-07-25 LAB
ALBUMIN/GLOBULIN RATIO: 1.6 (CALC) (ref 1–2.5)
ALBUMIN: 4.1 G/DL (ref 3.6–5.1)
ALKALINE PHOSPHATASE: 62 U/L (ref 35–144)
ALT: 23 U/L (ref 9–46)
AST: 18 U/L (ref 10–35)
BILIRUBIN, TOTAL: 0.5 MG/DL (ref 0.2–1.2)
BUN: 20 MG/DL (ref 7–25)
CALCIUM: 9.2 MG/DL (ref 8.6–10.3)
CARBON DIOXIDE: 25 MMOL/L (ref 20–32)
CHLORIDE: 107 MMOL/L (ref 98–110)
CHOL/HDLC RATIO: 4.4 (CALC)
CHOLESTEROL, TOTAL: 223 MG/DL
CREATININE: 1.24 MG/DL (ref 0.7–1.25)
EGFR IF AFRICN AM: 71 ML/MIN/1.73M2
EGFR IF NONAFRICN AM: 61 ML/MIN/1.73M2
GLOBULIN: 2.5 G/DL (CALC) (ref 1.9–3.7)
GLUCOSE: 102 MG/DL (ref 65–99)
HDL CHOLESTEROL: 51 MG/DL
HEMOGLOBIN A1C: 5.6 % OF TOTAL HGB
LDL-CHOLESTEROL: 145 MG/DL (CALC)
NON-HDL CHOLESTEROL: 172 MG/DL (CALC)
POTASSIUM: 4.4 MMOL/L (ref 3.5–5.3)
PROTEIN, TOTAL: 6.6 G/DL (ref 6.1–8.1)
SODIUM: 140 MMOL/L (ref 135–146)
T4, FREE: 0.9 NG/DL (ref 0.8–1.8)
TRIGLYCERIDES: 141 MG/DL
TSH: 0.92 MIU/L (ref 0.4–4.5)

## 2021-10-19 ENCOUNTER — OFFICE VISIT (OUTPATIENT)
Dept: SURGERY | Facility: CLINIC | Age: 61
End: 2021-10-19
Payer: COMMERCIAL

## 2021-10-19 DIAGNOSIS — R39.9 LOWER URINARY TRACT SYMPTOMS: ICD-10-CM

## 2021-10-19 DIAGNOSIS — N40.0 ENLARGED PROSTATE: ICD-10-CM

## 2021-10-19 DIAGNOSIS — R82.90 URINE FINDING: Primary | ICD-10-CM

## 2021-10-19 PROCEDURE — 99213 OFFICE O/P EST LOW 20 MIN: CPT | Performed by: UROLOGY

## 2021-10-19 PROCEDURE — 81003 URINALYSIS AUTO W/O SCOPE: CPT | Performed by: UROLOGY

## 2021-10-19 PROCEDURE — 51798 US URINE CAPACITY MEASURE: CPT | Performed by: UROLOGY

## 2021-10-19 NOTE — PROGRESS NOTES
Rooming Clinician:     Michael Mckinney is a 59year old male. Patient presents with: Follow - Up: Patient presents today for follow up, c/o BPH        HPI:     Patient presents for a follow-up visit.   He states he is voiding comfortably and without discharge. The testicles are descended bilaterally and are normal shape and size. The prostate exam is slightly enlarged, about 20-25 cc and benign.      BACK: no CVA tenderness  NEURO: grossly normal  EXTREMITIES: no cyanosis, clubbing or edema    LAB:

## 2022-01-21 ENCOUNTER — TELEPHONE (OUTPATIENT)
Dept: SURGERY | Facility: CLINIC | Age: 62
End: 2022-01-21

## 2022-01-21 NOTE — TELEPHONE ENCOUNTER
Current Outpatient Medications   Medication Sig Dispense Refill   • tadalafil 5 MG Oral Tab Take one daily for BPH with lower urinary tract symtpoms 30 tablet 11     Please complete prior authorization for the above listed medication. Thank you.

## 2022-01-24 NOTE — TELEPHONE ENCOUNTER
RN called [facility] to initiate a prior authorization for continuing therapy of Tadalafil 5mg. Call was forwarded to [employer] at [phone] by Jacinto Mcwilliams. Spoke to Jassi at [employer] and transferred call again to [phone].

## 2022-01-24 NOTE — TELEPHONE ENCOUNTER
RN called [employer] [phone] to get clarification of the fax number. Spoke to [employer]. Said, no PA form documented on their end was faxed to us. Member ID: [id number]    She will fax the form again.

## 2022-01-26 NOTE — TELEPHONE ENCOUNTER
RN received a fax approval for Tadalafil 5mg from "[employer]ah[employer] P[employer] Tabat[employer], Broker" (phone number: [phone], fax: [phone]). Copy sent to Medical records today, 1/26/22. RN updated patient via [employer].

## 2022-08-02 RX ORDER — TADALAFIL 5 MG/1
TABLET ORAL
Qty: 90 TABLET | Refills: 0 | Status: SHIPPED | OUTPATIENT
Start: 2022-08-02

## 2022-08-25 ENCOUNTER — OFFICE VISIT (OUTPATIENT)
Dept: FAMILY MEDICINE CLINIC | Facility: CLINIC | Age: 62
End: 2022-08-25
Payer: COMMERCIAL

## 2022-08-25 VITALS
SYSTOLIC BLOOD PRESSURE: 142 MMHG | HEART RATE: 78 BPM | HEIGHT: 70 IN | DIASTOLIC BLOOD PRESSURE: 64 MMHG | BODY MASS INDEX: 26.92 KG/M2 | OXYGEN SATURATION: 98 % | WEIGHT: 188 LBS | RESPIRATION RATE: 16 BRPM

## 2022-08-25 DIAGNOSIS — N40.1 BENIGN PROSTATIC HYPERPLASIA WITH URINARY FREQUENCY: ICD-10-CM

## 2022-08-25 DIAGNOSIS — Z00.00 ROUTINE GENERAL MEDICAL EXAMINATION AT A HEALTH CARE FACILITY: Primary | ICD-10-CM

## 2022-08-25 DIAGNOSIS — E78.00 PURE HYPERCHOLESTEROLEMIA: ICD-10-CM

## 2022-08-25 DIAGNOSIS — R35.0 BENIGN PROSTATIC HYPERPLASIA WITH URINARY FREQUENCY: ICD-10-CM

## 2022-08-25 PROBLEM — E11.9 TYPE 2 DIABETES MELLITUS WITHOUT COMPLICATION, WITHOUT LONG-TERM CURRENT USE OF INSULIN (HCC): Status: RESOLVED | Noted: 2019-02-12 | Resolved: 2022-08-25

## 2022-08-25 PROCEDURE — 99396 PREV VISIT EST AGE 40-64: CPT | Performed by: FAMILY MEDICINE

## 2022-08-25 PROCEDURE — 3077F SYST BP >= 140 MM HG: CPT | Performed by: FAMILY MEDICINE

## 2022-08-25 PROCEDURE — 3008F BODY MASS INDEX DOCD: CPT | Performed by: FAMILY MEDICINE

## 2022-08-25 PROCEDURE — 3078F DIAST BP <80 MM HG: CPT | Performed by: FAMILY MEDICINE

## 2022-08-25 RX ORDER — LEVOTHYROXINE, LIOTHYRONINE 57; 13.5 UG/1; UG/1
TABLET ORAL
COMMUNITY
Start: 2022-03-31

## 2022-08-25 NOTE — PATIENT INSTRUCTIONS
Lifetime risk of Shingles (must have had chicken pox or have been exposed to it) is 30%. For -Americans this risk is 15%. Shingrix lowers the risk to 1.5 to 3%. The previously used Zostavax lowers it to 6% of ever getting shingles in your lifetime. Shingrix: New vaccine released in 2018. Prevents shingles 90-95% of the time. Two doses are given between 2 and 6 months apart. Side effects: Pain at injection site 70-90% of cases. Redness in almost 40%. Swelling in almost 30%. Also risk of muscle aches over 50%, fatigue over 50%, headache 50%, and fever or chills and approximately 25%. The side effects resolve in 2-3 days. Not currently covered by Medicare. Cost approximately $190 per dose at [facility]. If not received today, you may want to check with your insurance company for coverage. You can get shingles more than once and thus should be vaccinated even if you have had them before. If you elect to do this in the future, you may schedule a nurse visit if within the next 365 days. If the first dose is done today, the second dose should be scheduled as a nurse visit in 2 to 6 months.

## 2022-08-27 LAB
ALBUMIN/GLOBULIN RATIO: 1.7 (CALC) (ref 1–2.5)
ALBUMIN: 4.5 G/DL (ref 3.6–5.1)
ALKALINE PHOSPHATASE: 69 U/L (ref 35–144)
ALT: 40 U/L (ref 9–46)
AST: 24 U/L (ref 10–35)
BILIRUBIN, TOTAL: 0.6 MG/DL (ref 0.2–1.2)
BUN/CREATININE RATIO: 15 (CALC) (ref 6–22)
BUN: 20 MG/DL (ref 7–25)
CALCIUM: 9.9 MG/DL (ref 8.6–10.3)
CARBON DIOXIDE: 28 MMOL/L (ref 20–32)
CHLORIDE: 104 MMOL/L (ref 98–110)
CHOL/HDLC RATIO: 4.6 (CALC)
CHOLESTEROL, TOTAL: 235 MG/DL
CREATININE, RANDOM URINE: 69 MG/DL (ref 20–320)
CREATININE: 1.36 MG/DL (ref 0.7–1.35)
EGFR: 58 ML/MIN/1.73M2
GLOBULIN: 2.7 G/DL (CALC) (ref 1.9–3.7)
GLUCOSE: 94 MG/DL (ref 65–139)
HDL CHOLESTEROL: 51 MG/DL
HEMOGLOBIN A1C: 6 % OF TOTAL HGB
LDL-CHOLESTEROL: 140 MG/DL (CALC)
MICROALBUMIN/CREATININE RATIO, RANDOM URINE: 3 MCG/MG CREAT
MICROALBUMIN: 0.2 MG/DL
NON-HDL CHOLESTEROL: 184 MG/DL (CALC)
POTASSIUM: 4.2 MMOL/L (ref 3.5–5.3)
PROTEIN, TOTAL: 7.2 G/DL (ref 6.1–8.1)
PSA, TOTAL: 2.01 NG/ML
SODIUM: 138 MMOL/L (ref 135–146)
TRIGLYCERIDES: 311 MG/DL
TSH W/REFLEX TO FT4: 1.07 MIU/L (ref 0.4–4.5)

## 2022-12-13 ENCOUNTER — OFFICE VISIT (OUTPATIENT)
Dept: SURGERY | Facility: CLINIC | Age: 62
End: 2022-12-13
Payer: COMMERCIAL

## 2022-12-13 DIAGNOSIS — N40.0 ENLARGED PROSTATE: Primary | ICD-10-CM

## 2022-12-13 DIAGNOSIS — R68.82 LOW LIBIDO: ICD-10-CM

## 2022-12-13 LAB
APPEARANCE: CLEAR
BILIRUBIN: NEGATIVE
GLUCOSE (URINE DIPSTICK): NEGATIVE MG/DL
KETONES (URINE DIPSTICK): NEGATIVE MG/DL
LEUKOCYTES: NEGATIVE
MULTISTIX LOT#: ABNORMAL NUMERIC
NITRITE, URINE: NEGATIVE
PH, URINE: 5.5 (ref 4.5–8)
PROTEIN (URINE DIPSTICK): NEGATIVE MG/DL
SPECIFIC GRAVITY: 1.03 (ref 1–1.03)
URINE-COLOR: YELLOW
UROBILINOGEN,SEMI-QN: 0.2 MG/DL (ref 0–1.9)

## 2022-12-13 PROCEDURE — 81003 URINALYSIS AUTO W/O SCOPE: CPT | Performed by: UROLOGY

## 2022-12-13 PROCEDURE — 99213 OFFICE O/P EST LOW 20 MIN: CPT | Performed by: UROLOGY

## 2022-12-15 ENCOUNTER — TELEPHONE (OUTPATIENT)
Dept: SURGERY | Facility: CLINIC | Age: 62
End: 2022-12-15

## 2022-12-15 DIAGNOSIS — R79.89 LOW TESTOSTERONE: Primary | ICD-10-CM

## 2022-12-15 NOTE — TELEPHONE ENCOUNTER
RN called patient regarding lab draw. Per patient, he wanted it at [address] [facility] what to do. RN explained that he has to go to [facility] and order should be on their system. He agreed to plans and verbalized understanding.

## 2022-12-15 NOTE — TELEPHONE ENCOUNTER
Per pt states he was supposed to have lab orders put on mychart, pt does not see orders. Please advise thank you.

## 2022-12-15 NOTE — PROGRESS NOTES
Your recent urinalysis shows no evidence of red blood cells is normal.  Recommend follow up in the office as directed.     Sincerely,  Yash Oleary MD

## 2022-12-20 ENCOUNTER — PATIENT MESSAGE (OUTPATIENT)
Dept: SURGERY | Facility: CLINIC | Age: 62
End: 2022-12-20

## 2022-12-20 LAB
FREE TESTOSTERONE: 65.8 PG/ML (ref 35–155)
TESTOSTERONE, TOTAL,$/MS/MS: 317 NG/DL (ref 250–1100)

## 2022-12-21 RX ORDER — SILDENAFIL 100 MG/1
100 TABLET, FILM COATED ORAL
Qty: 30 TABLET | Refills: 3 | Status: SHIPPED | OUTPATIENT
Start: 2022-12-21

## 2023-09-21 ENCOUNTER — OFFICE VISIT (OUTPATIENT)
Dept: FAMILY MEDICINE CLINIC | Facility: CLINIC | Age: 63
End: 2023-09-21
Payer: COMMERCIAL

## 2023-09-21 VITALS
OXYGEN SATURATION: 94 % | BODY MASS INDEX: 27.2 KG/M2 | HEIGHT: 70 IN | SYSTOLIC BLOOD PRESSURE: 124 MMHG | RESPIRATION RATE: 16 BRPM | DIASTOLIC BLOOD PRESSURE: 64 MMHG | HEART RATE: 85 BPM | WEIGHT: 190 LBS

## 2023-09-21 DIAGNOSIS — G25.0 BENIGN ESSENTIAL TREMOR: ICD-10-CM

## 2023-09-21 DIAGNOSIS — E78.00 PURE HYPERCHOLESTEROLEMIA: ICD-10-CM

## 2023-09-21 DIAGNOSIS — Z00.00 ROUTINE GENERAL MEDICAL EXAMINATION AT A HEALTH CARE FACILITY: Primary | ICD-10-CM

## 2023-09-21 DIAGNOSIS — R35.0 BENIGN PROSTATIC HYPERPLASIA WITH URINARY FREQUENCY: ICD-10-CM

## 2023-09-21 DIAGNOSIS — N40.1 BENIGN PROSTATIC HYPERPLASIA WITH URINARY FREQUENCY: ICD-10-CM

## 2023-09-21 DIAGNOSIS — R79.89 ELEVATED FERRITIN LEVEL: ICD-10-CM

## 2023-09-21 PROCEDURE — 3074F SYST BP LT 130 MM HG: CPT | Performed by: FAMILY MEDICINE

## 2023-09-21 PROCEDURE — 3008F BODY MASS INDEX DOCD: CPT | Performed by: FAMILY MEDICINE

## 2023-09-21 PROCEDURE — 3078F DIAST BP <80 MM HG: CPT | Performed by: FAMILY MEDICINE

## 2023-09-21 PROCEDURE — 99397 PER PM REEVAL EST PAT 65+ YR: CPT | Performed by: FAMILY MEDICINE

## 2023-09-21 NOTE — PATIENT INSTRUCTIONS
They will show up on the top of your MyChart benign essential tremor: Not dangerous, can use a beta-blocker such as metoprolol or propranolol to control the symptoms. These are blood pressure medications. We need to be careful to not make your blood pressure too low. Lifetime risk of Shingles (must have had chicken pox or have been exposed to it) is 30%. For -Americans this risk is 15%. Shingrix lowers the risk to 1.5 to 3%. The previously used Zostavax lowers the risk to 6% of ever getting shingles in your lifetime. Shingrix: vaccine released in 2018. Prevents shingles 90-95% of the time. Two doses are given between 2 and 6 months apart. Side effects: Pain at injection site 70-90% of cases. Redness in almost 40%. Swelling in almost 30%. Also risk of muscle aches over 50%, fatigue over 50%, headache 50%, and fever or chills and approximately 25%. The side effects resolve in 2-3 days. Cost approximately $190 per dose at [facility]. Covered by Medicare as of January 1, 2023; part D through pharmacy only, not through the office. If you have other insurance,you may want to check with your insurance company for coverage. Typically covered by HMO plans; PPO plans vary by the contract. You can get shingles more than once and thus should be vaccinated even if you have had them before. If you elect to do this in the future, you may schedule a nurse visit if within the next 365 days. If the first dose is done today, the second dose should be scheduled as a nurse visit in 2 to 6 months. Prevnar 20 is the newest vaccine to prevent pneumonia. It was released for the general population in 2022. CDC guidelines state a patient over the age of 72 may elect to have this vaccine done 5 years after their most recent pneumonia vaccine. Previous pneumonia vaccines include Pneumovax 23 and Prevnar 13.   If you received these 2 vaccines after your 65th birthday, you are considered fully vaccinated.

## 2023-09-27 DIAGNOSIS — R71.8 RBC MICROCYTOSIS: Primary | ICD-10-CM

## 2023-09-27 LAB
% SATURATION: 26 % (CALC) (ref 20–48)
ABSOLUTE BASOPHILS: 21 CELLS/UL (ref 0–200)
ABSOLUTE EOSINOPHILS: 239 CELLS/UL (ref 15–500)
ABSOLUTE LYMPHOCYTES: 2496 CELLS/UL (ref 850–3900)
ABSOLUTE MONOCYTES: 429 CELLS/UL (ref 200–950)
ABSOLUTE NEUTROPHILS: 2115 CELLS/UL (ref 1500–7800)
ALT: 21 U/L (ref 9–46)
AST: 19 U/L (ref 10–35)
BASOPHILS: 0.4 %
EOSINOPHILS: 4.5 %
FERRITIN: 373 NG/ML (ref 24–380)
HEMATOCRIT: 44.8 % (ref 38.5–50)
HEMOGLOBIN A1C: 6 % OF TOTAL HGB
HEMOGLOBIN: 14.4 G/DL (ref 13.2–17.1)
IRON BINDING CAPACITY: 364 MCG/DL (CALC) (ref 250–425)
IRON, TOTAL: 96 MCG/DL (ref 50–180)
LYMPHOCYTES: 47.1 %
MCH: 26.1 PG (ref 27–33)
MCHC: 32.1 G/DL (ref 32–36)
MCV: 81.2 FL (ref 80–100)
MONOCYTES: 8.1 %
MPV: 9.8 FL (ref 7.5–12.5)
NEUTROPHILS: 39.9 %
PLATELET COUNT: 275 THOUSAND/UL (ref 140–400)
PSA, TOTAL: 2.92 NG/ML
RDW: 15.4 % (ref 11–15)
RED BLOOD CELL COUNT: 5.52 MILLION/UL (ref 4.2–5.8)
WHITE BLOOD CELL COUNT: 5.3 THOUSAND/UL (ref 3.8–10.8)

## 2023-12-07 ENCOUNTER — OFFICE VISIT (OUTPATIENT)
Dept: SURGERY | Facility: CLINIC | Age: 63
End: 2023-12-07
Payer: COMMERCIAL

## 2023-12-07 DIAGNOSIS — N40.0 ENLARGED PROSTATE: Primary | ICD-10-CM

## 2023-12-07 LAB
APPEARANCE: CLEAR
BILIRUBIN: NEGATIVE
GLUCOSE (URINE DIPSTICK): NEGATIVE MG/DL
KETONES (URINE DIPSTICK): NEGATIVE MG/DL
LEUKOCYTES: NEGATIVE
MULTISTIX LOT#: ABNORMAL NUMERIC
NITRITE, URINE: NEGATIVE
PH, URINE: 6 (ref 4.5–8)
PROTEIN (URINE DIPSTICK): NEGATIVE MG/DL
SPECIFIC GRAVITY: 1.03 (ref 1–1.03)
URINE-COLOR: YELLOW
UROBILINOGEN,SEMI-QN: 1 MG/DL (ref 0–1.9)

## 2023-12-07 PROCEDURE — 81003 URINALYSIS AUTO W/O SCOPE: CPT | Performed by: SURGERY

## 2023-12-07 PROCEDURE — 99214 OFFICE O/P EST MOD 30 MIN: CPT | Performed by: SURGERY

## 2023-12-07 NOTE — PROGRESS NOTES
Urology Clinic Note    Primary Care Provider:  Oswaldo Hester MD     Chief Complaint:   BPH followup    HPI:   Nery Farah is a 70year old male with history of DM2 and BPH/LUTS. Last PSA 2.92 on 9/26/23. Prior to that it was 2.01. He has no  complaints today. AUA symptom score is 4/mixed with LUTS. Urinalysis:Trace blood    PSA:  Lab Results   Component Value Date    PSA 1.52 01/22/2019    PSA 0.889 09/05/2013    PSAS 1.72 12/20/2017    PSAS 1.40 08/09/2016    QPSA 2.92 09/26/2023    QPSA 2.01 08/26/2022    QPSA 2.11 10/20/2021    QPSA 1.7 10/31/2020    QPSA 1.9 10/14/2019        History:     Past Medical History:   Diagnosis Date    Decorative tattoo     Type 2 diabetes mellitus without complication, without long-term current use of insulin ([address] 2/12/2019    Wears glasses        Past Surgical History:   Procedure Laterality Date    COLONOSCOPY  10/17/2018    Pandolfi       Family History   Problem Relation Age of Onset    Heart Disorder Father     Hypertension Father     Hypertension Mother     Hypertension Brother     Colon Polyps Brother        Social History     Socioeconomic History    Marital status:    Tobacco Use    Smoking status: Never    Smokeless tobacco: Never   Vaping Use    Vaping Use: Never used   Substance and Sexual Activity    Alcohol use: Yes     Alcohol/week: 3.0 standard drinks of alcohol     Types: 3 Cans of beer per week    Drug use: No       Medications (Active prior to today's visit):  Current Outpatient Medications   Medication Sig Dispense Refill    Sildenafil Citrate 100 MG Oral Tab Take 1 tablet (100 mg total) by mouth daily as needed for Erectile Dysfunction. 30 tablet 3       Allergies: Allergies   Allergen Reactions    Ace Inhibitors Coughing    Metformin DIARRHEA       Review of Systems:   A comprehensive 10-point review of systems was completed. Pertinent positives and negatives are noted in the the HPI.     Physical Exam:   CONSTITUTIONAL: Well developed, well nourished, in no acute distress  NEUROLOGIC: Alert and oriented  HEAD: Normocephalic, atraumatic  EYES: Sclera non-icteric  ENT: Hearing intact, moist mucous membranes  NECK: No obvious goiter or masses  RESPIRATORY: Normal respiratory effort  SKIN: No evident rashes  ABDOMEN: Soft, non-tender, non-distended  DIGITAL RECTAL EXAM: ~30 gram prostate, no nodules or tenderness    Assessment & Plan:   Lorena Zamora is a 70year old male with history of DM2 and BPH/LUTS. Last PSA 2.92 on 9/26/23. Prior to that it was 2.01. He has no  complaints today. - Repeat PSA now given recent increase  - RTC in 1 year if PSA stable with another PSA prior    In total, 30 minutes were spent on this patient encounter (including chart review, patient history, physical, and counseling, documentation, and communication). Anupama Turner.  Patito Craig MD  Staff Urologist  [place] [id number]  Office: [phone]

## 2023-12-08 NOTE — PROGRESS NOTES
Gagandeep Hendricks,    I have reviewed your urine test results. Tests show no significant abnormalities (no signs of microscopic red blood cells in the urine). No changes to the plan as we had previously discussed. Please let me know if you have any questions.     Thanks,  Janice Amanda MD

## 2023-12-17 LAB — PSA, TOTAL: 3.13 NG/ML

## 2023-12-19 DIAGNOSIS — R97.20 ELEVATED PSA: Primary | ICD-10-CM

## 2023-12-19 RX ORDER — SILDENAFIL 100 MG/1
100 TABLET, FILM COATED ORAL
Qty: 30 TABLET | Refills: 3 | Status: SHIPPED | OUTPATIENT
Start: 2023-12-19

## 2024-01-03 ENCOUNTER — OFFICE VISIT (OUTPATIENT)
Dept: FAMILY MEDICINE CLINIC | Facility: CLINIC | Age: 64
End: 2024-01-03
Payer: COMMERCIAL

## 2024-01-03 VITALS
SYSTOLIC BLOOD PRESSURE: 144 MMHG | HEIGHT: 70 IN | HEART RATE: 90 BPM | WEIGHT: 188 LBS | DIASTOLIC BLOOD PRESSURE: 86 MMHG | OXYGEN SATURATION: 95 % | BODY MASS INDEX: 26.92 KG/M2 | RESPIRATION RATE: 16 BRPM | TEMPERATURE: 99 F

## 2024-01-03 DIAGNOSIS — J01.00 ACUTE NON-RECURRENT MAXILLARY SINUSITIS: Primary | ICD-10-CM

## 2024-01-03 PROCEDURE — 99213 OFFICE O/P EST LOW 20 MIN: CPT | Performed by: FAMILY MEDICINE

## 2024-01-03 PROCEDURE — 3079F DIAST BP 80-89 MM HG: CPT | Performed by: FAMILY MEDICINE

## 2024-01-03 PROCEDURE — 3077F SYST BP >= 140 MM HG: CPT | Performed by: FAMILY MEDICINE

## 2024-01-03 PROCEDURE — 3008F BODY MASS INDEX DOCD: CPT | Performed by: FAMILY MEDICINE

## 2024-01-03 RX ORDER — AMOXICILLIN AND CLAVULANATE POTASSIUM 875; 125 MG/1; MG/1
1 TABLET, FILM COATED ORAL 2 TIMES DAILY
Qty: 20 TABLET | Refills: 0 | Status: SHIPPED | OUTPATIENT
Start: 2024-01-03 | End: 2024-01-13

## 2024-01-03 NOTE — PATIENT INSTRUCTIONS
Take antibiotics with food and plenty of water.   Eat yogurt or take probiotic daily. (Align is a good example of an OTC probiotic)  Make sure to finish the entire antibiotic treatment.  Increase fluids and rest.   Use otc meds as needed.  Monitor symptoms and contact the office if no better in 2-3 days.

## 2024-01-03 NOTE — PROGRESS NOTES
CHIEF COMPLAINT:     Chief Complaint   Patient presents with    Cough     Cough, congestion, x13 days     Negative home covid test 2 days ago        HPI:   Jaylon Montano is a 63 year old male who presents for sinus congestion for 13 days. Symptoms have been worsening since onset. Sinus congestion/pain is described as a pressure and is located mainly in the maxillary sinuses..  Reports perstistent post- nasal discharge. Has treated symptoms with otc nyquil/dayquil.  Patient also reports headache, cough, fullness in ears, sore throat.  Denies fever, dental pain, tinnitus, N/V/D.        Current Outpatient Medications   Medication Sig Dispense Refill    amoxicillin clavulanate 875-125 MG Oral Tab Take 1 tablet by mouth 2 (two) times daily for 10 days. 20 tablet 0    Sildenafil Citrate 100 MG Oral Tab Take 1 tablet (100 mg total) by mouth daily as needed for Erectile Dysfunction. (Patient not taking: Reported on 1/3/2024) 30 tablet 3      Past Medical History:   Diagnosis Date    Decorative tattoo     Type 2 diabetes mellitus without complication, without long-term current use of insulin ([facility]) 2/12/2019    Wears glasses       Past Surgical History:   Procedure Laterality Date    COLONOSCOPY  10/17/2018    Pandolfi      Family History   Problem Relation Age of Onset    Heart Disorder Father     Hypertension Father     Hypertension Mother     Hypertension Brother     Colon Polyps Brother       Social History     Socioeconomic History    Marital status:    Tobacco Use    Smoking status: Never    Smokeless tobacco: Never   Vaping Use    Vaping Use: Never used   Substance and Sexual Activity    Alcohol use: Yes     Alcohol/week: 3.0 standard drinks of alcohol     Types: 3 Cans of beer per week    Drug use: No         REVIEW OF SYSTEMS:   GENERAL: feels well otherwise, no unplanned weight change,  normal appetite  SKIN: no rashes or abnormal skin lesions  HEENT: See HPI.    LUNGS: denies shortness of breath or  wheezing, See HPI  CARDIOVASCULAR: denies chest pain or palpitations   GI: denies N/V/C or abdominal pain  NEURO: + sinus headaches.  No numbness or tingling in face.    EXAM:   /86   Pulse 90   Temp 98.6 °F (37 °C) (Temporal)   Resp 16   Ht 5' 10\" (1.778 m)   Wt 188 lb (85.3 kg)   SpO2 95%   BMI 26.98 kg/m²   GENERAL: well developed, well nourished,in no apparent distress  SKIN: no rashes,no suspicious lesions  HEAD: atraumatic, normocephalic, + tenderness on palpation of maxillary sinuses  EYES: conjunctiva clear, EOM intact  EARS: TM's pearly, no bulging, no retraction, no fluid, bony landmarks present  NOSE: nostrils patent, no nasal mucous, nasal mucosa reddened and boggy  THROAT: oral mucosa pink, moist. No visible dental caries. Posterior pharynx is no erythematous. no exudates.  NECK: supple, non-tender  LUNGS: clear to auscultation bilaterally, no wheezes or rhonchi. Breathing is non labored.  CARDIO: RRR without murmur  EXTREMITIES: no cyanosis, clubbing or edema  LYMPH:  no lymphadenopathy.    NEURO:  No focal deficits      ASSESSMENT AND PLAN:     Encounter Diagnosis   Name Primary?    Acute non-recurrent maxillary sinusitis Yes       No orders of the defined types were placed in this encounter.      Meds & Refills for this Visit:  Requested Prescriptions     Signed Prescriptions Disp Refills    amoxicillin clavulanate 875-125 MG Oral Tab 20 tablet 0     Sig: Take 1 tablet by mouth 2 (two) times daily for 10 days.           Risks, benefits, side effects of medication addressed and explained.    Patient Instructions   Take antibiotics with food and plenty of water.   Eat yogurt or take probiotic daily. (Align is a good example of an OTC probiotic)  Make sure to finish the entire antibiotic treatment.  Increase fluids and rest.   Use otc meds as needed.  Monitor symptoms and contact the office if no better in 2-3 days.      The patient indicates understanding of these issues and agrees to the  plan.

## 2024-04-08 RX ORDER — SILDENAFIL 100 MG/1
100 TABLET, FILM COATED ORAL
Qty: 30 TABLET | Refills: 3 | Status: SHIPPED | OUTPATIENT
Start: 2024-04-08

## 2024-07-19 ENCOUNTER — LAB ENCOUNTER (OUTPATIENT)
Dept: LAB | Age: 64
End: 2024-07-19
Attending: SURGERY
Payer: COMMERCIAL

## 2024-07-19 ENCOUNTER — TELEPHONE (OUTPATIENT)
Dept: SURGERY | Facility: CLINIC | Age: 64
End: 2024-07-19

## 2024-07-19 ENCOUNTER — OFFICE VISIT (OUTPATIENT)
Dept: SURGERY | Facility: CLINIC | Age: 64
End: 2024-07-19
Payer: COMMERCIAL

## 2024-07-19 DIAGNOSIS — N40.0 ENLARGED PROSTATE: ICD-10-CM

## 2024-07-19 DIAGNOSIS — N40.0 ENLARGED PROSTATE: Primary | ICD-10-CM

## 2024-07-19 LAB
APPEARANCE: CLEAR
BILIRUBIN: NEGATIVE
GLUCOSE (URINE DIPSTICK): NEGATIVE MG/DL
KETONES (URINE DIPSTICK): NEGATIVE MG/DL
LEUKOCYTES: NEGATIVE
MULTISTIX LOT#: NORMAL NUMERIC
NITRITE, URINE: NEGATIVE
OCCULT BLOOD: NEGATIVE
PH, URINE: 5.5 (ref 4.5–8)
PROTEIN (URINE DIPSTICK): NEGATIVE MG/DL
SPECIFIC GRAVITY: 1.03 (ref 1–1.03)
URINE-COLOR: YELLOW
UROBILINOGEN,SEMI-QN: 0.2 MG/DL (ref 0–1.9)

## 2024-07-19 PROCEDURE — 99213 OFFICE O/P EST LOW 20 MIN: CPT | Performed by: SURGERY

## 2024-07-19 PROCEDURE — 81003 URINALYSIS AUTO W/O SCOPE: CPT | Performed by: SURGERY

## 2024-07-19 PROCEDURE — 84154 ASSAY OF PSA FREE: CPT

## 2024-07-19 PROCEDURE — 84153 ASSAY OF PSA TOTAL: CPT

## 2024-07-19 PROCEDURE — 36415 COLL VENOUS BLD VENIPUNCTURE: CPT

## 2024-07-19 NOTE — PROGRESS NOTES
Urology Clinic Note    Primary Care Provider:  Mazin Garcia MD     Chief Complaint:   BPH followup     HPI:   Jaylon Montano is a 63 year old male with history of DM2 and BPH/LUTS.     Last PSA 2.92 on 9/26/23. Prior to that it was 2.01.     Repeat PSA on 12/16/24 was 3.13. Plan was for 6 month repeat.     Good urinary stream. Occasional urinary frequency but high fluid intake. Nocturia 1-2 times per night. He does cut off fluids before bed time by 2-3 hours.    AUA symptom score is 5/3 with LUTS.      PSA:  Lab Results   Component Value Date    PSA 1.52 01/22/2019    PSA 0.889 09/05/2013    PSAS 1.72 12/20/2017    PSAS 1.40 08/09/2016    QPSA 3.13 12/16/2023    QPSA 2.92 09/26/2023    QPSA 2.01 08/26/2022    QPSA 2.11 10/20/2021    QPSA 1.7 10/31/2020    QPSA 1.9 10/14/2019        History:     Past Medical History:    Decorative tattoo    Type 2 diabetes mellitus without complication, without long-term current use of insulin (HCC)    Wears glasses       Past Surgical History:   Procedure Laterality Date    Colonoscopy  10/17/2018    Yuliana       Family History   Problem Relation Age of Onset    Heart Disorder Father     Hypertension Father     Hypertension Mother     Hypertension Brother     Colon Polyps Brother        Social History     Socioeconomic History    Marital status:    Tobacco Use    Smoking status: Never    Smokeless tobacco: Never   Vaping Use    Vaping status: Never Used   Substance and Sexual Activity    Alcohol use: Yes     Alcohol/week: 3.0 standard drinks of alcohol     Types: 3 Cans of beer per week    Drug use: No       Medications (Active prior to today's visit):  Current Outpatient Medications   Medication Sig Dispense Refill    Sildenafil Citrate 100 MG Oral Tab Take 1 tablet (100 mg total) by mouth daily as needed for Erectile Dysfunction. 30 tablet 3       Allergies:  Allergies   Allergen Reactions    Ace Inhibitors Coughing    Metformin DIARRHEA       Review of  Systems:   A comprehensive 10-point review of systems was completed.  Pertinent positives and negatives are noted in the the HPI.    Physical Exam:   CONSTITUTIONAL: Well developed, well nourished, in no acute distress  NEUROLOGIC: Alert and oriented  HEAD: Normocephalic, atraumatic  EYES: Sclera non-icteric  ENT: Hearing intact, moist mucous membranes  NECK: No obvious goiter or masses  RESPIRATORY: Normal respiratory effort  SKIN: No evident rashes  ABDOMEN: Soft, non-tender, non-distended  DIGITAL RECTAL EXAM: ~30 gram prostate, no nodules or tenderness    Assessment & Plan:   Jaylon Montano is a 63 year old male with history of DM2 and BPH/LUTS.     Last PSA 2.92 on 9/26/23. Prior to that it was 2.01.     Repeat PSA on 12/16/24 was 3.13. Plan was for 6 month repeat.     Good urinary stream. Occasional urinary frequency but high fluid intake. Nocturia 1-2 times per night. He does cut off fluids before bed time by 2-3 hours.    - Repeat PSA and free PSA today  - RTC in 1 year if stable    In total, 20 minutes were spent on this patient encounter (including chart review, patient history, physical, and counseling, documentation, and communication).    Pawel Gould MD  Staff Urologist  [facility]  Office: [phone]

## 2024-07-19 NOTE — TELEPHONE ENCOUNTER
Patient returning missed call that is asking him to come at 1:30PM today instead of 2PM. Patient states that he is working today and can not be there at 1:30PM and would like to keep the 2PM appointment.

## 2024-07-20 LAB
% FREE PSA: 7.6 %
PROSTATE SPECIFIC AG: 4.2 NG/ML
PSA, FREE: 0.32 NG/ML

## 2024-07-23 ENCOUNTER — TELEPHONE (OUTPATIENT)
Dept: SURGERY | Facility: CLINIC | Age: 64
End: 2024-07-23

## 2024-07-25 NOTE — TELEPHONE ENCOUNTER
Recent PSA 4.2, free 7.6% on 7/19/24    Prior PSA  <2 prior to 2021  2.01 on 8/25/22  2.92 on 9/26/23  3.13 on 12/16/24

## 2024-10-31 ENCOUNTER — LAB ENCOUNTER (OUTPATIENT)
Dept: LAB | Age: 64
End: 2024-10-31
Payer: COMMERCIAL

## 2024-10-31 ENCOUNTER — OFFICE VISIT (OUTPATIENT)
Dept: FAMILY MEDICINE CLINIC | Facility: CLINIC | Age: 64
End: 2024-10-31
Payer: COMMERCIAL

## 2024-10-31 VITALS
BODY MASS INDEX: 27.06 KG/M2 | DIASTOLIC BLOOD PRESSURE: 76 MMHG | SYSTOLIC BLOOD PRESSURE: 136 MMHG | WEIGHT: 189 LBS | HEART RATE: 87 BPM | OXYGEN SATURATION: 96 % | HEIGHT: 70 IN | RESPIRATION RATE: 16 BRPM

## 2024-10-31 DIAGNOSIS — R97.20 ELEVATED PSA: Primary | ICD-10-CM

## 2024-10-31 DIAGNOSIS — R73.03 PREDIABETES: ICD-10-CM

## 2024-10-31 DIAGNOSIS — Z00.00 ANNUAL PHYSICAL EXAM: Primary | ICD-10-CM

## 2024-10-31 DIAGNOSIS — E55.9 VITAMIN D DEFICIENCY: ICD-10-CM

## 2024-10-31 DIAGNOSIS — M65.339 TRIGGER MIDDLE FINGER, UNSPECIFIED LATERALITY: ICD-10-CM

## 2024-10-31 PROCEDURE — 99397 PER PM REEVAL EST PAT 65+ YR: CPT | Performed by: FAMILY MEDICINE

## 2024-10-31 PROCEDURE — 36415 COLL VENOUS BLD VENIPUNCTURE: CPT

## 2024-10-31 NOTE — PATIENT INSTRUCTIONS
Follow up nurse visit for pneumonia shot    Go to [facility] to get shingles vaccine x 2.

## 2024-10-31 NOTE — H&P
Jaylon Montano is a 64 year old male who presents for a complete physical exam.   HPI:   Pt complains of    2. Prediabetes.  Last A1c value was 6% done 9/26/2023.     + vit D def.  Not taking vit D.  No fractures.  Mild fatigue.    4. Trigger middle finger, unspecified laterality.  Chronic.  Worsening.  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    Colonoscopy: 10/17/2018     Current Outpatient Medications   Medication Sig Dispense Refill    Sildenafil Citrate 100 MG Oral Tab Take 1 tablet (100 mg total) by mouth daily as needed for Erectile Dysfunction. 30 tablet 3      Past Medical History:    Decorative tattoo    Type 2 diabetes mellitus without complication, without long-term current use of insulin (HCC)    Wears glasses      Past Surgical History:   Procedure Laterality Date    Colonoscopy  10/17/2018    Pandolfi      Family History   Problem Relation Age of Onset    Heart Disorder Father     Hypertension Father     Hypertension Mother     Hypertension Brother     Colon Polyps Brother       Social History:  Social History     Socioeconomic History    Marital status:    Tobacco Use    Smoking status: Never    Smokeless tobacco: Never   Vaping Use    Vaping status: Never Used   Substance and Sexual Activity    Alcohol use: Yes     Alcohol/week: 3.0 standard drinks of alcohol     Types: 3 Cans of beer per week    Drug use: No         REVIEW OF SYSTEMS:   GENERAL: feels well otherwise  SKIN: denies any unusual skin lesions or rash  EYES:denies blurred vision or double vision  HEENT: denies nasal congestion, sinus pain or ST, denies decreased hearing  LUNGS: denies shortness of breath or frequent cough  CV: denies chest pain, pressure or palpitations  GI: denies abdominal pain, heartburn, chronic diarrhea or constipation  : denies nocturia or changes in stream, denies erectile dysfunction  MS: denies back pain, myalgias or arthralgias  NEURO: denies headaches or dizziness  PSYCH: denies depression  or anxiety  HEMATOLOGIC: denies bruising or bleeding easily  ENDOCRINE: denies frequent thirst or urination, denies unintentional weight gain/loss    EXAM:   /76   Pulse 87   Resp 16   Ht 5' 10\" (1.778 m)   Wt 189 lb (85.7 kg)   SpO2 96%   BMI 27.12 kg/m²       Body mass index is 27.12 kg/m².     GENERAL: well developed, well nourished,in no apparent distress  SKIN: no rashes,no suspicious lesions  HEENT: atraumatic, normocephalic,TMs clear, posterior pharynx clear  EYES: PERRLA,conjunctiva clear  NECK: supple,no thyromegaly, no adenopathy  LUNGS: clear to auscultation, easy breathing, no cough  CV: S1 S2 noted, RRR, no murmur  GI: active bowel sounds, no rebound/rigidity/tenderness, no HSM  : 2 descended testes, no scrotal mass or tenderness, normal penis no lesions, no hernia  MS: Demond, good tone & strength. Trigger finger b/l middle finger.  EXT: no cyanosis, clubbing or edema  NEURO: Alert & oriented x 3, LEs +2DTRs  PSYCH: Mood and affect appropriate    ASSESSMENT AND PLAN:   Jaylon Montano is a 64 year old male who presents for a complete physical exam. Heart healthy diet, routine exercise, and annual flu vaccines encouraged.  The patient indicates understanding of these issues and agrees to the plan.  Orders Placed This Encounter   Procedures    Lipid Panel    CBC With Differential With Platelet    Comp Metabolic Panel (14)    TSH W Reflex To Free T4    Hemoglobin A1C    VITAMIN D, 25-HYDROXY [10706][Q]     Order Specific Question:   Release to patient     Answer:   Immediate     Follow up in 1 year.  1. Annual physical exam  - Lipid Panel  - CBC With Differential With Platelet  - Comp Metabolic Panel (14)  - TSH W Reflex To Free T4  - Hemoglobin A1C    2. Prediabetes  - Hemoglobin A1C    3. Vitamin D deficiency  - VITAMIN D, 25-HYDROXY [33103][Q]    4. Trigger middle finger, unspecified laterality  - Ortho Referral - In Network

## 2024-11-06 ENCOUNTER — TELEPHONE (OUTPATIENT)
Dept: SURGERY | Facility: CLINIC | Age: 64
End: 2024-11-06

## 2024-11-06 DIAGNOSIS — R97.20 ELEVATED PSA: Primary | ICD-10-CM

## 2024-11-08 NOTE — TELEPHONE ENCOUNTER
Ordered prostate MRI to plan for fusion biopsy if possible with pirads 3-5 rating. If MRI returns with pirads 1-2 rating, would still recommend biopsy, would just be under US guidance instead.     4k 41.7 repeat PSA 3.77 free percentage 5.

## 2024-11-10 LAB
ABSOLUTE BASOPHILS: 30 CELLS/UL (ref 0–200)
ABSOLUTE EOSINOPHILS: 100 CELLS/UL (ref 15–500)
ABSOLUTE LYMPHOCYTES: 2125 CELLS/UL (ref 850–3900)
ABSOLUTE MONOCYTES: 410 CELLS/UL (ref 200–950)
ABSOLUTE NEUTROPHILS: 2335 CELLS/UL (ref 1500–7800)
ALBUMIN/GLOBULIN RATIO: 1.6 (CALC) (ref 1–2.5)
ALBUMIN: 4.5 G/DL (ref 3.6–5.1)
ALKALINE PHOSPHATASE: 72 U/L (ref 35–144)
ALT: 24 U/L (ref 9–46)
AST: 21 U/L (ref 10–35)
BASOPHILS: 0.6 %
BILIRUBIN, TOTAL: 0.7 MG/DL (ref 0.2–1.2)
BUN/CREATININE RATIO: 15 (CALC) (ref 6–22)
BUN: 22 MG/DL (ref 7–25)
CALCIUM: 9.4 MG/DL (ref 8.6–10.3)
CARBON DIOXIDE: 25 MMOL/L (ref 20–32)
CHLORIDE: 105 MMOL/L (ref 98–110)
CHOL/HDLC RATIO: 4.5 (CALC)
CHOLESTEROL, TOTAL: 217 MG/DL
CREATININE: 1.47 MG/DL (ref 0.7–1.35)
EGFR: 52 ML/MIN/1.73M2
EOSINOPHILS: 2 %
GLOBULIN: 2.8 G/DL (CALC) (ref 1.9–3.7)
GLUCOSE: 100 MG/DL (ref 65–99)
HDL CHOLESTEROL: 48 MG/DL
HEMATOCRIT: 46 % (ref 38.5–50)
HEMOGLOBIN A1C: 5.9 % OF TOTAL HGB
HEMOGLOBIN: 15.2 G/DL (ref 13.2–17.1)
LDL-CHOLESTEROL: 143 MG/DL (CALC)
LYMPHOCYTES: 42.5 %
MCH: 27.5 PG (ref 27–33)
MCHC: 33 G/DL (ref 32–36)
MCV: 83.2 FL (ref 80–100)
MONOCYTES: 8.2 %
MPV: 10 FL (ref 7.5–12.5)
NEUTROPHILS: 46.7 %
NON-HDL CHOLESTEROL: 169 MG/DL (CALC)
PLATELET COUNT: 259 THOUSAND/UL (ref 140–400)
POTASSIUM: 4.7 MMOL/L (ref 3.5–5.3)
PROTEIN, TOTAL: 7.3 G/DL (ref 6.1–8.1)
RDW: 14.8 % (ref 11–15)
RED BLOOD CELL COUNT: 5.53 MILLION/UL (ref 4.2–5.8)
SODIUM: 139 MMOL/L (ref 135–146)
TRIGLYCERIDES: 133 MG/DL
TSH W/REFLEX TO FT4: 1.28 MIU/L (ref 0.4–4.5)
VITAMIN D, 25-OH, TOTAL: 70 NG/ML (ref 30–100)
WHITE BLOOD CELL COUNT: 5 THOUSAND/UL (ref 3.8–10.8)

## 2024-12-09 ENCOUNTER — TELEPHONE (OUTPATIENT)
Dept: SURGERY | Facility: CLINIC | Age: 64
End: 2024-12-09

## 2024-12-09 NOTE — TELEPHONE ENCOUNTER
Patient states that he is returning the physicians call from Saturday, and he wants her to know that he does have MRI of the Prostate scheduled for Friday 12/13/2024 at 9:45AM.

## 2024-12-13 ENCOUNTER — HOSPITAL ENCOUNTER (OUTPATIENT)
Dept: MRI IMAGING | Facility: HOSPITAL | Age: 64
Discharge: HOME | End: 2024-12-13
Payer: COMMERCIAL

## 2024-12-13 DIAGNOSIS — R97.20 ELEVATED PSA: ICD-10-CM

## 2024-12-13 PROCEDURE — A9575 INJ GADOTERATE MEGLUMI 0.1ML: HCPCS

## 2024-12-13 PROCEDURE — 72197 MRI PELVIS W/O & W/DYE: CPT

## 2024-12-13 RX ORDER — GADOTERATE MEGLUMINE 376.9 MG/ML
20 INJECTION INTRAVENOUS
Status: COMPLETED | OUTPATIENT
Start: 2024-12-13 | End: 2024-12-13

## 2024-12-13 RX ADMIN — GADOTERATE MEGLUMINE 17 ML: 376.9 INJECTION INTRAVENOUS at 10:25:00

## 2024-12-16 ENCOUNTER — TELEPHONE (OUTPATIENT)
Dept: SURGERY | Facility: CLINIC | Age: 64
End: 2024-12-16

## 2024-12-16 DIAGNOSIS — R82.90 URINE FINDING: ICD-10-CM

## 2024-12-16 DIAGNOSIS — R97.20 ELEVATED PSA: Primary | ICD-10-CM

## 2024-12-16 RX ORDER — CEFDINIR 300 MG/1
300 CAPSULE ORAL EVERY 12 HOURS
Qty: 6 CAPSULE | Refills: 0 | Status: SHIPPED | OUTPATIENT
Start: 2024-12-16 | End: 2024-12-19

## 2024-12-16 RX ORDER — CIPROFLOXACIN 500 MG/1
500 TABLET, FILM COATED ORAL 2 TIMES DAILY
Qty: 6 TABLET | Refills: 0 | Status: SHIPPED | OUTPATIENT
Start: 2024-12-16 | End: 2024-12-19

## 2024-12-16 RX ORDER — DIAZEPAM 10 MG/1
10 TABLET ORAL SEE ADMIN INSTRUCTIONS
Qty: 2 TABLET | Refills: 0 | Status: SHIPPED | OUTPATIENT
Start: 2024-12-16

## 2024-12-17 ENCOUNTER — TELEPHONE (OUTPATIENT)
Dept: SURGERY | Facility: CLINIC | Age: 64
End: 2024-12-17

## 2024-12-17 ENCOUNTER — TELEPHONE (OUTPATIENT)
Facility: CLINIC | Age: 64
End: 2024-12-17

## 2024-12-17 DIAGNOSIS — M79.641 BILATERAL HAND PAIN: Primary | ICD-10-CM

## 2024-12-17 DIAGNOSIS — M79.642 BILATERAL HAND PAIN: Primary | ICD-10-CM

## 2024-12-17 NOTE — TELEPHONE ENCOUNTER
Patient is asking if TOM Bee ordered patient blood work at [facility]. Please call.

## 2024-12-17 NOTE — TELEPHONE ENCOUNTER
New pt appt for bilateral hand pain can't move middle finger for both hands. no imaging avail please advise  Future Appointments  12/31/2024 8:00 AM    Madeline Jo PA        [id number]  7/14/2025  3:00 PM    Pawel Gould MD           [id number]           [facility]

## 2024-12-24 ENCOUNTER — TELEPHONE (OUTPATIENT)
Dept: SURGERY | Facility: CLINIC | Age: 64
End: 2024-12-24

## 2024-12-24 NOTE — TELEPHONE ENCOUNTER
Patient asking if he should do lab work that was ordered to [facility] now or if it needs to be done 1-2 weeks before 1/28/25 biopsy. See 12/17 patient message. Please call.

## 2025-01-10 ENCOUNTER — TELEPHONE (OUTPATIENT)
Dept: SURGERY | Facility: CLINIC | Age: 65
End: 2025-01-10

## 2025-01-10 ENCOUNTER — OFFICE VISIT (OUTPATIENT)
Dept: ORTHOPEDICS CLINIC | Facility: CLINIC | Age: 65
End: 2025-01-10
Payer: COMMERCIAL

## 2025-01-10 ENCOUNTER — HOSPITAL ENCOUNTER (OUTPATIENT)
Dept: GENERAL RADIOLOGY | Age: 65
Discharge: HOME | End: 2025-01-10
Attending: PHYSICIAN ASSISTANT
Payer: COMMERCIAL

## 2025-01-10 VITALS — HEIGHT: 70 IN | WEIGHT: 189 LBS | BODY MASS INDEX: 27.06 KG/M2

## 2025-01-10 DIAGNOSIS — M65.331 TRIGGER MIDDLE FINGER OF RIGHT HAND: Primary | ICD-10-CM

## 2025-01-10 DIAGNOSIS — M79.644 FINGER PAIN, RIGHT: ICD-10-CM

## 2025-01-10 DIAGNOSIS — M65.332 TRIGGER MIDDLE FINGER OF LEFT HAND: ICD-10-CM

## 2025-01-10 DIAGNOSIS — M79.645 FINGER PAIN, LEFT: ICD-10-CM

## 2025-01-10 PROCEDURE — 73140 X-RAY EXAM OF FINGER(S): CPT | Performed by: PHYSICIAN ASSISTANT

## 2025-01-10 RX ORDER — BETAMETHASONE SODIUM PHOSPHATE AND BETAMETHASONE ACETATE 3; 3 MG/ML; MG/ML
6 INJECTION, SUSPENSION INTRA-ARTICULAR; INTRALESIONAL; INTRAMUSCULAR; SOFT TISSUE ONCE
Status: COMPLETED | OUTPATIENT
Start: 2025-01-10 | End: 2025-01-10

## 2025-01-10 RX ADMIN — BETAMETHASONE SODIUM PHOSPHATE AND BETAMETHASONE ACETATE 6 MG: 3; 3 INJECTION, SUSPENSION INTRA-ARTICULAR; INTRALESIONAL; INTRAMUSCULAR; SOFT TISSUE at 07:29:00

## 2025-01-10 NOTE — H&P
Clinic Note [facility]     Assessment/Plan:  64 year old with triggering of bilateral middle fingers.  I discussed with the patient various treatment options and their success rate/risks.  We using a shared decision-making process decided to proceed with a corticosteroid injection.   Patient will follow up in 6 weeks.  If patient symptoms are completely resolved, patient can cancel the appointment and follow-up on an as-needed basis.    Procedure:  Injection: Written consent was obtained.  Skin was prepped with ChloraPrep.  1 mL of 6 mg of betamethasone and 1 mL of 1% lidocaine was injected into the bilateral middle fingers.  Patient tolerated the procedure well.  No complications were encountered.  Band-Aid was applied.      Physical Exam:    Ht 5' 10\" (1.778 m)   Wt 189 lb (85.7 kg)   BMI 27.12 kg/m²     Constitutional: NAD. AOx3. Well-developed and Well-nourished.   Psychiatric: Normal mood/ affect/ behavior. Judgment and thought content normal.     Bilateral upper Extremity:   Inspection: Skin Intact. No skin lesions. No gross deformity.   Palpation:  (+) TTP over A1 pulley   Motion: Elbow: normal bilateral symmetric ext/flex  Wrist: normal bilateral symmetric ext/flex/sup/pro  Finger: full composite fist,    Special Tests: (+) Active triggering. (-) PIPJ contracture. Normally sensate digit. Normally perfused digit.       CC: Triggering of bile middle fingers    HPI: 64 year old right-hand-dominant male presents with triggering of bilateral middle finger. It started 6 to 7 months ago. It has failed to improve/resolve. Pain level is moderate. Pain is described as locking. Patient has tried nothing yet.     (+) pain at A1 Pulley  (+) active triggering    Past Medical History:    Decorative tattoo    Type 2 diabetes mellitus without complication, without long-term current use of insulin (HCC)    Wears glasses     Past Surgical History:   Procedure Laterality Date    Colonoscopy  10/17/2018    Yuliana      Current Outpatient Medications   Medication Sig Dispense Refill    Sildenafil Citrate 100 MG Oral Tab Take 1 tablet (100 mg total) by mouth daily as needed for Erectile Dysfunction. 30 tablet 3    diazePAM (VALIUM) 10 MG Oral Tab Take 1 tablet (10 mg total) by mouth See Admin Instructions. Take 1-2 tablets by mouth 20-30 minutes before procedure. (Patient not taking: Reported on 1/10/2025) 2 tablet 0     Allergies[1]  Family History   Problem Relation Age of Onset    Heart Disorder Father     Hypertension Father     Hypertension Mother     Hypertension Brother     Colon Polyps Brother      Social History     Occupational History    Not on file   Tobacco Use    Smoking status: Never    Smokeless tobacco: Never   Vaping Use    Vaping status: Never Used   Substance and Sexual Activity    Alcohol use: Yes     Alcohol/week: 3.0 standard drinks of alcohol     Types: 3 Cans of beer per week    Drug use: No    Sexual activity: Not on file        Review of Systems (negative unless bolded):  General: fevers, chills, fatigue  CV:  chest pain, palpitations, leg swelling  Msk: bodyaches, neck pain, neck stiffness  Skin: rashes, open wounds, nonhealing ulcers  Hem: bleeds easily, bruise easily, immunocompromised  Neuro: dizziness, light headedness, headaches  Psych: anxious, depressed, anger issues    Madeline Jo PA-C  Hand, Wrist, & Elbow Surgery  Physician Assistant to Dr. [email or web address]  t: [phone]  f: [phone]         [1]   Allergies  Allergen Reactions    Ace Inhibitors Coughing    Metformin DIARRHEA

## 2025-01-10 NOTE — TELEPHONE ENCOUNTER
Pt called.  Scheduled for a prostate biopsy on 1-28-25.  Unable to find the letter on [employer] with instruction.  Please send.  Call pt

## 2025-01-13 ENCOUNTER — LAB ENCOUNTER (OUTPATIENT)
Dept: LAB | Facility: HOSPITAL | Age: 65
End: 2025-01-13
Payer: COMMERCIAL

## 2025-01-13 PROCEDURE — 87086 URINE CULTURE/COLONY COUNT: CPT

## 2025-01-15 ENCOUNTER — TELEPHONE (OUTPATIENT)
Dept: SURGERY | Facility: CLINIC | Age: 65
End: 2025-01-15

## 2025-01-15 NOTE — TELEPHONE ENCOUNTER
Per patient does not understand urine test results and asking to discuss with a nurse. Please advise

## 2025-01-17 NOTE — TELEPHONE ENCOUNTER
Phoned patient to notify that Ucx was negative and contaminated on collection.  Patient reports he receive a MyChart of his results.   Patient verbalized understanding.  Encounter Closed.

## 2025-01-20 NOTE — TELEPHONE ENCOUNTER
Patient contaminated Ucx on collection.  Wants to know if he should retest?    FEDE Morley:  for you review and recommendation.

## 2025-01-20 NOTE — TELEPHONE ENCOUNTER
Given upcoming biopsy, reasonable to check repeat urine culture. Unlikely infection, but would recommend repeat.

## 2025-01-23 ENCOUNTER — LAB ENCOUNTER (OUTPATIENT)
Dept: LAB | Facility: HOSPITAL | Age: 65
End: 2025-01-23
Attending: SURGERY
Payer: COMMERCIAL

## 2025-01-23 PROCEDURE — 87086 URINE CULTURE/COLONY COUNT: CPT | Performed by: SURGERY

## 2025-01-27 NOTE — DISCHARGE INSTRUCTIONS
Performed by Dr. BEASLEY.    1. Hematuria (blood in urine) and/or blood in your bowel movement    Usually minimal-lasting 24 hours is expected  Hydrating with additional fluids such as water an juices is necessary to minimize hematuria.  Blood may also be present in stool after bowel movement. Avoid straining during elimination, avoid constipating food items.  Call ordering doctor if bleeding persists or worsens, such as a darker in appearance or thicker in consistency.    2. Activity  Rest quietly for the next 24 hours. Avoid straining, lifting heavy items, or strenuous physical activity for approximately 2 days.    3. Infection  Continue with antibiotics as ordered. If further signs or symptoms of infection occur such as:  fever (temp greater than 100) or severe discomfort persist, call the ordering doctor. Use tylenol for discomfort. Avoid aspirin, NSAIDs, and Ibuprofen products for 48 hours.    4.  Diet  No dietary restrictions except food products that cause constipation.Hydrate well.    5.  Results  Call/follow-up with ordering doctor for results, usually 5-7 days.    THANK YOU, WE WISH YOU WELL

## 2025-01-28 ENCOUNTER — HOSPITAL ENCOUNTER (OUTPATIENT)
Dept: ULTRASOUND IMAGING | Facility: HOSPITAL | Age: 65
Discharge: HOME | End: 2025-01-28
Attending: SURGERY
Payer: COMMERCIAL

## 2025-01-28 DIAGNOSIS — R97.20 ELEVATED PSA: ICD-10-CM

## 2025-01-28 PROCEDURE — 76872 US TRANSRECTAL: CPT | Performed by: SURGERY

## 2025-01-28 PROCEDURE — 55700 BIOPSY OF PROSTATE,NEEDLE/PUNCH: CPT | Performed by: SURGERY

## 2025-01-28 NOTE — PROCEDURES
Clinic Procedure Note    INDICATIONS:   Jaylon Montano is a 64 year old male with history of DM2 and BPH/LUTS.     Last PSA 2.92 on 23. Prior to that it was 2.01.      Repeat PSA on 24 was 3.13. Plan was for 6 month repeat.     Good urinary stream. Occasional urinary frequency but high fluid intake. Nocturia 1-2 times per night. He does cut off fluids before bed time by 2-3 hours.    Repeat PSA 2024 was 4.2.    Prostate MRI on 2024 showed volume 26 cc, 1.6 cm PI-RADS 4 lesion with broad capsular contact in the left PZ mid gland/apex.    PSA:  Lab Results   Component Value Date    PSA 4.2 (H) 2024    PSA 1.52 2019    PSA 0.889 2013    PSAS 1.72 2017    PSAS 1.40 2016    QPSA 3.13 2023    QPSA 2.92 2023    QPSA 2.01 2022    QPSA 2.11 10/20/2021    QPSA 1.7 10/31/2020    QPSA 1.9 10/14/2019        PROCEDURE:    1. Transrectal ultrasound of the prostate with UroNav MRI Fusion    2. Periprostatic nerve block, bilateral    3. Ultrasound-guided needle placement    4. Prostate needle biopsy    DATE OF PROCEDURE: 2025     PRE-PROCEDURE DIAGNOSIS: Elevated PSA    POST-PROCEDURE DIAGNOSIS: Same     SURGEON: Pawel Gould MD    FINDINGS:    Digital Rectal Exam: ~30g prostate, no nodules or tenderness    Prostate Ultrasound: 23mL prostate volume    SPECIMEN: Prostate biopsies (12 standard biopsies + 4 biopsies of the MRI region of interest)    PROCEDURE:   Patient was brought to the procedure suite and a time-out was performed confirming the patient, , and procedure to be performed. The risks and benefits of the procedure were once again described to patient including bleeding (hematospermia, hematochezia, and hematuria), infection (including severe sepsis), temporary erectile dysfunction, and pelvic pain. The patient agreed to proceed and informed consent form was signed. He took 3 doses of Cefdinir and Ciprofloxacin since yesterday, including 2  hours before this procedure, and will take 3 additional doses of each antibiotic after the procedure.    He was placed on his side with knees towards his chest. A digital rectal examination was performed, with findings as above. The well-lubricated ultrasound probe was inserted into the rectum, and the prostate was visualized and measured (as above). The MRI images were fused with the ultrasound using the UroNav technology. Then, 5 mL of 1% lidocaine without epinephrine was injected into the periprostatic nerve bundles on each side (10 mL total) using ultrasound guidance.      Using ultrasound guidance, core biopsies were taken of the MRI region(s) of interests, and then were taken from the lateral and medial aspects of the base, middle and apex of each lobe. These were labeled and sent to pathology for analysis.    The probe was removed from the rectum and a finger was placed in the rectum to hold pressure on the prostate for several minutes. There was no active bleeding after removal of the finger. There were no complications and the patient tolerated the biopsy without issue.    PLAN:   He will follow up in 1 week to review his pathology (or I will call him if it results sooner) and determine the best management course. Post-procedure instructions were given and he knows to go to the ED if he experiences fevers/chills, has significant bleeding, or is unable to urinate.      Pawel Gould MD  Staff Urologist  [facility]  Office: [phone]

## 2025-01-28 NOTE — IMAGING NOTE
PATIENT ARRIVAL TIME:1350    ULTRASOUND TECH: Nida    PHYSICIAN TO PERFORM PROCEDURE: MD RAMOS     HISTORY TAKEN: ELEVATED PSA    BASE LINE VITAL SIGNS:/106      ANTIBIOTICS TAKEN: Y    FLEETS ENEMA TAKEN Y    PROCEDURE EXPLAINED: 1355    CONSENT OBTAINED: 1402    PHYSICIAN ARRIVAL TIME:1405    TIME OUT COMPLETED @: 1406     IMAGES/SCANS CORRELATED: 1409    LIDOCAINE INSTILLED UTILIZING 22 G-20 CM CHIBA NEEDLE @   1409    SAMPLING BEGUN @:1410     CORE SAMPLES WITH 18 G - 25 CM BARD BIOPSY NEEDLE: 1410    SITES:   REGION OF INTEREST (KIRILL),  4 PASSES    RIGHT MID/BASE/APEX,  2 PASSES ALL AREAS     LEFT MID/BASE/APEX  2 PASSES ALL AREAS    SAMPLES APPLIED TO TELFA PRIOR TO IMMERSING INTO FORMALIN 10% SOLUTION    SAMPLING COMPLETED: 1416    POST PROCEDURE VITAL SIGNS: /106 HR 95    SPECIMENS TO LAB/CYTOLOGY/GROSS ROOM BY THIS RN    1440 PATIENT DISCHARGES-AVS INSTRUCTIONS PROVIDED

## 2025-02-04 ENCOUNTER — OFFICE VISIT (OUTPATIENT)
Dept: SURGERY | Facility: CLINIC | Age: 65
End: 2025-02-04
Payer: COMMERCIAL

## 2025-02-04 DIAGNOSIS — C61 PROSTATE CANCER (HCC): Primary | ICD-10-CM

## 2025-02-04 PROCEDURE — G2211 COMPLEX E/M VISIT ADD ON: HCPCS | Performed by: SURGERY

## 2025-02-04 PROCEDURE — 99214 OFFICE O/P EST MOD 30 MIN: CPT | Performed by: SURGERY

## 2025-02-04 NOTE — PROGRESS NOTES
Urology Clinic Note    Primary Care Provider:  Ariel Trimble DO     Chief Complaint:   Prostate cancer    HPI & Assessment:   Jaylon Montano is a 64 year old male with history of DM2 and BPH/LUTS.     Last PSA 2.92 on 9/26/23. Prior to that it was 2.01.      Repeat PSA on 12/16/24 was 3.13. Plan was for 6 month repeat.     Good urinary stream. Occasional urinary frequency but high fluid intake. Nocturia 1-2 times per night. He does cut off fluids before bed time by 2-3 hours.    Repeat PSA 7/19/2024 was 4.2.    Prostate MRI on 12/13/2024 showed volume 26 cc, 1.6 cm PI-RADS 4 lesion with broad capsular contact in the left PZ mid gland/apex.    I performed MRI fusion prostate biopsy on 1/28/2025.  Pathology showed Rud[place] 4+3 prostate cancer in the right mid gland and region of interest, [place] 3+4 prostate cancer in the left base and right base, Ruddy 3+3 in the right apex and left mid.                Location [place] Score % of Pattern 4  Grade Group   Positive Cores / Total Cores  Tumor Length (mm)  % Tissue Involved       A. Right apex 3+3=6 - 1 1/3 1.6 5    B. Right mid 4+3=7 75 3 2/2 3.4 10    C. Left apex - - - - - -    D. Left mid 3+3=6 - 1 1/2 1.9 5    E. Left base 3+4=7 25 2 1/2 1.7 8    F. Region of interest 4+3=7 70 3 3/4 3.9 7    G. Right base 3+4=7 60 2 2/2 21.8 75     I had a long discussion with the patient regarding prostate cancer diagnosis. I discussed natural history of well-differentiated prostate cancer with patient. I informed him that his PSA and biopsy results place him in the unfavorable intermediate risk group, per the NCCN guidelines.    I discussed that the guidelines recommend bone and soft tissue imaging when in this risk category.  This can be done with either a PSMA PET scan or a bone scan + CT scan or MRI.    I discussed management options for patient with >10 year life expectancy, including radical prostatectomy with pelvic lymphadenectomy (open vs. robotic), external beam  radiation (EBRT) + androgen deprivation therapy (ADT), and EBRT + ADT + brachytherapy.    We discussed surgical management via robotic prostatectomy with bilateral pelvic lymph node dissection.  I described the procedure and discussed the risks of surgery including, but not limited to, bleeding, infection, injury to surrounding structures (including rectum), delayed bleeding, lymphocele, hernia, pain, urethral stricture, incontinence, erectile dysfunction, cancer persistence, cancer recurrence, need for conversion to open procedure, and need for additional procedures.    We then discussed radiation options including external beam radiation therapy and brachytherapy.  I explained he would need a referral to a Radiation Oncologist to further discuss these options.  I discussed side effects of radiation which include, but are not limited to, urinary incontinence, urethral strictures, erectile dysfunction, radiation colitis and radiation cystitis. I explained these complications may appear months to years after therapy. I also discussed irritative voiding symptoms that could occur with radiation therapy such as urinary urge incontinence, frequency, and dysuria.    Regarding ADT, a referral to medical oncology will be placed to further discuss this if he elects to proceed with radiation.    I answered his questions regarding each treatment option. I referred the patient to the NCCN Patient Resources website for additional information.    Plan:   -PSMA PET scan  -Referral to radiation oncology  -Return to clinic in 4-6 weeks to discuss final management decision       PSA:  Lab Results   Component Value Date    PSA 4.2 (H) 07/19/2024    PSA 1.52 01/22/2019    PSA 0.889 09/05/2013    PSAS 1.72 12/20/2017    PSAS 1.40 08/09/2016    QPSA 3.13 12/16/2023    QPSA 2.92 09/26/2023    QPSA 2.01 08/26/2022    QPSA 2.11 10/20/2021    QPSA 1.7 10/31/2020    QPSA 1.9 10/14/2019        History:     Past Medical History:    Decorative  tattoo    Type 2 diabetes mellitus without complication, without long-term current use of insulin (HCC)    Wears glasses       Past Surgical History:   Procedure Laterality Date    Colonoscopy  10/17/2018    Pandolfi       Family History   Problem Relation Age of Onset    Heart Disorder Father     Hypertension Father     Hypertension Mother     Hypertension Brother     Colon Polyps Brother        Social History     Socioeconomic History    Marital status:    Tobacco Use    Smoking status: Never    Smokeless tobacco: Never   Vaping Use    Vaping status: Never Used   Substance and Sexual Activity    Alcohol use: Yes     Alcohol/week: 3.0 standard drinks of alcohol     Types: 3 Cans of beer per week    Drug use: No       Medications (Active prior to today's visit):  Current Outpatient Medications   Medication Sig Dispense Refill    diazePAM (VALIUM) 10 MG Oral Tab Take 1 tablet (10 mg total) by mouth See Admin Instructions. Take 1-2 tablets by mouth 20-30 minutes before procedure. (Patient not taking: Reported on 1/10/2025) 2 tablet 0    Sildenafil Citrate 100 MG Oral Tab Take 1 tablet (100 mg total) by mouth daily as needed for Erectile Dysfunction. 30 tablet 3       Allergies:  Allergies[1]    Review of Systems:   A comprehensive 10-point review of systems was completed.  Pertinent positives and negatives are noted in the the HPI.    Physical Exam:   CONSTITUTIONAL: Well developed, well nourished, in no acute distress  NEUROLOGIC: Alert and oriented  HEAD: Normocephalic, atraumatic  EYES: Sclera non-icteric  ENT: Hearing intact, moist mucous membranes  NECK: No obvious goiter or masses  RESPIRATORY: Normal respiratory effort  SKIN: No evident rashes  ABDOMEN: Soft, non-tender, non-distended      In total, 30 minutes were spent on this patient encounter (including chart review, patient history, physical, counseling, documentation, and communication).    Pawel Gould MD  Staff Urologist  [facility]  Office: [phone]             [1]   Allergies  Allergen Reactions    Ace Inhibitors Coughing    Metformin DIARRHEA

## 2025-02-19 ENCOUNTER — HOSPITAL ENCOUNTER (OUTPATIENT)
Dept: NUCLEAR MEDICINE | Facility: HOSPITAL | Age: 65
Discharge: HOME | End: 2025-02-19
Attending: SURGERY
Payer: COMMERCIAL

## 2025-02-19 ENCOUNTER — TELEPHONE (OUTPATIENT)
Age: 65
End: 2025-02-19

## 2025-02-19 DIAGNOSIS — C61 PROSTATE CANCER (HCC): ICD-10-CM

## 2025-02-19 PROCEDURE — 78815 PET IMAGE W/CT SKULL-THIGH: CPT | Performed by: SURGERY

## 2025-02-19 NOTE — TELEPHONE ENCOUNTER
Patient called for consult.  He had a Pet scan today ordered by Dr Gould.  DX-Prostate.  Please call to schedule. Thank you.

## 2025-03-04 ENCOUNTER — TELEPHONE (OUTPATIENT)
Dept: SURGERY | Facility: CLINIC | Age: 65
End: 2025-03-04

## 2025-03-04 ENCOUNTER — OFFICE VISIT (OUTPATIENT)
Dept: SURGERY | Facility: CLINIC | Age: 65
End: 2025-03-04
Payer: COMMERCIAL

## 2025-03-04 DIAGNOSIS — C61 PROSTATE CANCER (HCC): Primary | ICD-10-CM

## 2025-03-04 LAB
APPEARANCE: CLEAR
BILIRUBIN: NEGATIVE
GLUCOSE (URINE DIPSTICK): NEGATIVE MG/DL
KETONES (URINE DIPSTICK): NEGATIVE MG/DL
MULTISTIX LOT#: ABNORMAL NUMERIC
NITRITE, URINE: NEGATIVE
PH, URINE: 5.5 (ref 4.5–8)
PROTEIN (URINE DIPSTICK): NEGATIVE MG/DL
SPECIFIC GRAVITY: >=1.03 (ref 1–1.03)
URINE-COLOR: YELLOW
UROBILINOGEN,SEMI-QN: 0.2 MG/DL (ref 0–1.9)

## 2025-03-04 PROCEDURE — 99214 OFFICE O/P EST MOD 30 MIN: CPT | Performed by: SURGERY

## 2025-03-04 PROCEDURE — G2211 COMPLEX E/M VISIT ADD ON: HCPCS | Performed by: SURGERY

## 2025-03-04 PROCEDURE — 81003 URINALYSIS AUTO W/O SCOPE: CPT | Performed by: SURGERY

## 2025-03-04 NOTE — PROGRESS NOTES
Urology Clinic Note    Primary Care Provider:  Ariel Trimble DO     Chief Complaint:   Prostate cancer     HPI & Assessment:   Jaylon Montano is a 64 year old male with history of DM2 and BPH/LUTS.     Last PSA 2.92 on 9/26/23. Prior to that it was 2.01.      Repeat PSA on 12/16/24 was 3.13. Plan was for 6 month repeat.     Good urinary stream. Occasional urinary frequency but high fluid intake. Nocturia 1-2 times per night. He does cut off fluids before bed time by 2-3 hours.    Repeat PSA 7/19/2024 was 4.2.     Prostate MRI on 12/13/2024 showed volume 26 cc, 1.6 cm PI-RADS 4 lesion with broad capsular contact in the left PZ mid gland/apex.     I performed MRI fusion prostate biopsy on 1/28/2025.  Pathology showed Ruddy 4+3 prostate cancer in the right mid gland and region of interest, [place] 3+4 prostate cancer in the left base and right base, Rud[place] 3+3 in the right apex and left mid.    PSMA PET scan 2/19/2025 showed indeterminant pulmonary nodules up to 6 mm (not PET avid), no signs of metastatic disease.                 Location [place] Score % of Pattern 4  Grade Group   Positive Cores / Total Cores  Tumor Length (mm)  % Tissue Involved       A. Right apex 3+3=6 - 1 1/3 1.6 5    B. Right mid 4+3=7 75 3 2/2 3.4 10    C. Left apex - - - - - -    D. Left mid 3+3=6 - 1 1/2 1.9 5    E. Left base 3+4=7 25 2 1/2 1.7 8    F. Region of interest 4+3=7 70 3 3/4 3.9 7    G. Right base 3+4=7 60 2 2/2 21.8 75     AUA symptom score is 8/1 with LUTS.    Urinalysis: Small LE, trace blood    Plan:   -Follow-up with radiation oncology  -Discussed robotic prostatectomy again today  -Patient will let me know if he decides to proceed with radiation/ADT or surgery    I had a long discussion with the patient regarding prostate cancer diagnosis. I discussed natural history of well-differentiated prostate cancer with patient. I informed him that his PSA and biopsy results place him in the unfavorable intermediate risk  group, per the NCCN guidelines.    I discussed that the guidelines recommend bone and soft tissue imaging when in this risk category.  This can be done with either a PSMA PET scan or a bone scan + CT scan or MRI.    I discussed management options for patient with >10 year life expectancy, including radical prostatectomy with pelvic lymphadenectomy (open vs. robotic), external beam radiation (EBRT) + androgen deprivation therapy (ADT), and EBRT + ADT + brachytherapy.    We discussed surgical management via robotic prostatectomy with bilateral pelvic lymph node dissection.  I described the procedure and discussed the risks of surgery including, but not limited to, bleeding, infection, injury to surrounding structures (including rectum), delayed bleeding, lymphocele, hernia, pain, urethral stricture, incontinence, erectile dysfunction, cancer persistence, cancer recurrence, need for conversion to open procedure, and need for additional procedures.    We then discussed radiation options including external beam radiation therapy and brachytherapy.  I explained he would need a referral to a Radiation Oncologist to further discuss these options.  I discussed side effects of radiation which include, but are not limited to, urinary incontinence, urethral strictures, erectile dysfunction, radiation colitis and radiation cystitis. I explained these complications may appear months to years after therapy. I also discussed irritative voiding symptoms that could occur with radiation therapy such as urinary urge incontinence, frequency, and dysuria.    Regarding ADT, a referral to medical oncology will be placed to further discuss this if he elects to proceed with radiation.    I answered his questions regarding each treatment option. I referred the patient to the NCCN Patient Resources website for additional information.       PSA:  Lab Results   Component Value Date    PSA 4.2 (H) 07/19/2024    PSA 1.52 01/22/2019    PSA 0.889  09/05/2013    PSAS 1.72 12/20/2017    PSAS 1.40 08/09/2016    QPSA 3.13 12/16/2023    QPSA 2.92 09/26/2023    QPSA 2.01 08/26/2022    QPSA 2.11 10/20/2021    QPSA 1.7 10/31/2020    QPSA 1.9 10/14/2019        History:     Past Medical History:    Decorative tattoo    Type 2 diabetes mellitus without complication, without long-term current use of insulin (HCC)    Wears glasses       Past Surgical History:   Procedure Laterality Date    Colonoscopy  10/17/2018    Pandolfi       Family History   Problem Relation Age of Onset    Heart Disorder Father     Hypertension Father     Hypertension Mother     Hypertension Brother     Colon Polyps Brother        Social History     Socioeconomic History    Marital status:    Tobacco Use    Smoking status: Never    Smokeless tobacco: Never   Vaping Use    Vaping status: Never Used   Substance and Sexual Activity    Alcohol use: Yes     Alcohol/week: 3.0 standard drinks of alcohol     Types: 3 Cans of beer per week    Drug use: No       Medications (Active prior to today's visit):  Current Outpatient Medications   Medication Sig Dispense Refill    diazePAM (VALIUM) 10 MG Oral Tab Take 1 tablet (10 mg total) by mouth See Admin Instructions. Take 1-2 tablets by mouth 20-30 minutes before procedure. (Patient not taking: Reported on 1/10/2025) 2 tablet 0    Sildenafil Citrate 100 MG Oral Tab Take 1 tablet (100 mg total) by mouth daily as needed for Erectile Dysfunction. 30 tablet 3       Allergies:  Allergies[1]    Review of Systems:   A comprehensive 10-point review of systems was completed.  Pertinent positives and negatives are noted in the the HPI.    Physical Exam:   CONSTITUTIONAL: Well developed, well nourished, in no acute distress  NEUROLOGIC: Alert and oriented  HEAD: Normocephalic, atraumatic  EYES: Sclera non-icteric  ENT: Hearing intact, moist mucous membranes  NECK: No obvious goiter or masses  RESPIRATORY: Normal respiratory effort  SKIN: No evident  rashes  ABDOMEN: Soft, non-tender, non-distended      In total, 30 minutes were spent on this patient encounter (including chart review, patient history, physical, counseling, documentation, and communication).    Pawel Gould MD  Staff Urologist  [facility]  Office: [phone]           [1]   Allergies  Allergen Reactions    Ace Inhibitors Coughing    Metformin DIARRHEA

## 2025-03-04 NOTE — TELEPHONE ENCOUNTER
Sent [place] paperwork to forms via email and interoffice mail.     Copy of paperwork in drawer at .

## 2025-03-11 ENCOUNTER — HOSPITAL ENCOUNTER (OUTPATIENT)
Dept: RADIATION ONCOLOGY | Facility: HOSPITAL | Age: 65
Discharge: HOME | End: 2025-03-11
Attending: RADIOLOGY
Payer: COMMERCIAL

## 2025-03-11 VITALS
HEIGHT: 70 IN | BODY MASS INDEX: 27.89 KG/M2 | OXYGEN SATURATION: 95 % | SYSTOLIC BLOOD PRESSURE: 156 MMHG | RESPIRATION RATE: 16 BRPM | TEMPERATURE: 98 F | DIASTOLIC BLOOD PRESSURE: 88 MMHG | HEART RATE: 78 BPM | WEIGHT: 194.81 LBS

## 2025-03-11 DIAGNOSIS — C61 PROSTATE CANCER (HCC): Primary | ICD-10-CM

## 2025-03-11 PROCEDURE — 99214 OFFICE O/P EST MOD 30 MIN: CPT

## 2025-03-11 NOTE — CONSULTS
[facility]  RADIATION ONCOLOGY  CONSULTATION     PATIENT:   Jaylon Montano        REFERRING MD:  Pawel Gould MD  DIAGNOSIS:   Prostate cancer      HPI   63 yo consult with family.    Hx of BPH and elevated PSA of 4.2 in 2024. MRI 2024 showed a 25 mL gland, a 1.5 cm [place] 4 lesion on the left. T2a N0.     MRI fusion biopsy 2025 showed GS 4+3 adenocarcinoma with other areas of GS 3+4 and 3+3.  10 positive cores.     PET 2025 showed non specific small lung nodules, non specific prostatic uptake, no cristin or distant disease.     Not really happy with urinary function, though AUA score is 6. Reports good stream and nocturia of 1.   Good baseline bowel function.    PMH  -DM2, BPH    PSH  -colonoscopy     MEDS   diazePAM (VALIUM) 10 mg, Oral, See admin instructions, Take 1-2 tablets by mouth 20-30 minutes before procedure.    Sildenafil Citrate (VIAGRA) 100 mg, Oral, Daily as needed     SH  -    FH  -neg for prostate cancer    ROS  -pertinent items in HPI.     PHYSICAL EXAM   ECO  GENERAL: 194 pounds.  CHEST: Regular rate and rhythm.    ABD:  Soft, non-tender.  EXT:  No edema.   NEURO: Alert and oriented.     IMPRESSION/PLAN   63 yo in good health with cT2a N0 M0, iPSA 4, GS 4+3 prostate cancer  Unfavorable intermediate risk    Leaning in favor of prostatectomy  Supported this reasonable option  Discussed indications for postoperative RT  Discussed alternative of ADT (6 months) with EBRT  Will sleep on it for a few days and get back to Dr. Luis Fernando Cox MD  Radiation Oncology    CC: ALESHA Gould MD    50 minutes were spent with the patient, more than 50 percent on counseling/coordination of care (discuss disease status, goals of therapy, methods of radiation therapy, side effect discussion, role of RT in overall care)

## 2025-03-11 NOTE — PROGRESS NOTES
Nursing Consultation Note  Patient: Jaylon Montano  YOB: 1960  Age: 64 year old  Radiation Oncologist: Dr. Tj Cox  Referring Physician: Pawel Gould  Diagnosis: PROSTATE CANCER  Consult Date: 3/11/2025      Chemotherapy: N/A  Labs: Reviewed  Imaging: Reviewed  Is the patient of child-bearing age?         No  Has the patient received radiation therapy in the past? no  Does the patient have an implantable device?No   Patient has/has had:     1. Assistive Devices: N/A    2. Flu Vaccination: no-referral to ask PCP    3. Pneumonia Vaccination:  no--referral to ask PCP    Vital Signs:   Vitals:    03/11/25 1453   BP: 156/88   Pulse: 78   Resp: 16   Temp: 97.8 °F (36.6 °C)   ,   Wt Readings from Last 6 Encounters:   03/11/25 88.4 kg (194 lb 12.8 oz)   01/10/25 85.7 kg (189 lb)   10/31/24 85.7 kg (189 lb)   01/03/24 85.3 kg (188 lb)   09/21/23 86.2 kg (190 lb)   08/25/22 85.3 kg (188 lb)       Nursing Note: Newly-diagnosed prostate cancer. Noted increasing PSA, was 4.2 on 7/19/24. Met with Dr. Gould, ordered MRI of prostate. Done on 12/13/24, showed PI-RADS 4 lesion. Recommended biopsy, done on 1/28/25. Path showed adenoca, GS 4+3. PSMA PET done on 2/19/25. Followed up with Dr. Gould, discussed treatment options and referred for RT consult. Pt here with wife and daughter, AOx4. AUA = 6, KIMBERLY = 15. Appetite good, no c/o abdominal discomfort or bloating. BM regular. Denies dysuria, hematuria or incontinence. Nocturia x1 with steady stream.         Review of Systems   Constitutional: Negative.    HENT: Negative.     Eyes: Negative.    Respiratory: Negative.     Cardiovascular: Negative.    Gastrointestinal: Negative.    Endocrine: Negative.    Genitourinary: Negative.    Musculoskeletal:  Positive for back pain.        Chronic back pain   Skin: Negative.    Allergic/Immunologic: Negative.    Neurological: Negative.    Hematological: Negative.    Psychiatric/Behavioral: Negative.             Allergies:  Allergies[1]    Current Outpatient Medications   Medication Sig Dispense Refill    Sildenafil Citrate 100 MG Oral Tab Take 1 tablet (100 mg total) by mouth daily as needed for Erectile Dysfunction. 30 tablet 3    diazePAM (VALIUM) 10 MG Oral Tab Take 1 tablet (10 mg total) by mouth See Admin Instructions. Take 1-2 tablets by mouth 20-30 minutes before procedure. (Patient not taking: Reported on 3/11/2025) 2 tablet 0       Preferred Pharmacy:    [id number] - [facility] [address], [phone], [phone]  [address]  [facility] [address]  Phone: [phone] Fax: [phone]    [id number] - [place], [place] - [address] [phone], [phone]  [address]  [address]  [facility] [address]  Phone: [phone] Fax: [phone]      Past Medical History:    Decorative tattoo    Prostate cancer (HCC)    Type 2 diabetes mellitus without complication, without long-term current use of insulin (HCC)    Wears glasses       Past Surgical History:   Procedure Laterality Date    Colonoscopy  10/17/2018    [facility] (cpt=76942/02460)         Social History     Socioeconomic History    Marital status:      Spouse name: Not on file    Number of children: 5    Years of education: Not on file    Highest education level: Not on file   Occupational History    Not on file   Tobacco Use    Smoking status: Never    Smokeless tobacco: Never   Vaping Use    Vaping status: Never Used   Substance and Sexual Activity    Alcohol use: Yes     Alcohol/week: 3.0 standard drinks of alcohol     Types: 3 Cans of beer per week     Comment: beer once a month    Drug use: No    Sexual activity: Yes     Partners: Female   Other Topics Concern    Caffeine Concern Not Asked    Exercise Not Asked    Seat Belt Not Asked    Special Diet Not Asked    Stress Concern Not Asked    Weight Concern Not Asked   Social History Narrative    , lives with wife     Has 5 daughters, 3 in [place]     Social Drivers of Health     Food Insecurity: Not on file   Transportation Needs: Not on file   Housing Stability: Not on file       ECOG:  Grade 0 - Fully active, able to carry on all predisease activities without restrictions.      Education: YES  Knowledge Deficit Plan Of Care:    Problem:  Knowledge Deficit    Problems related to:    Radiation therapy    Interventions:  Instruct on purpose of radiation therapy    Expected Outcomes:  Knowledge of radiation therapy    Progress Toward Outcome:  Making progress    Pamphlets/Handouts Given to Patient:  Understanding radiation therapy      Are ADL's met?  Yes  Does patient feel safe in their environment?  Yes  Care decisions:  Patient and/or surrogate IS involved in care decisions.  Advanced directives:  Patient DOES NOT have advanced directives.  Transportation:  Adequate transportation available for expected visits    Pain:   ;Pain Score: 0   ;    ;          [1]   Allergies  Allergen Reactions    Ace Inhibitors Coughing    Metformin DIARRHEA

## 2025-03-11 NOTE — PATIENT INSTRUCTIONS
- WE WILL CALL TO SCHEDULE YOUR CT SIMULATION FOR RADIATION PLANNING.    - PLEASE FOLLOW FULL BLADDER INSTRUCTIONS FOR YOUR CT SIMULATION AND SUBSEQUENT TREATMENTS     - IF YOU HAVE ANY QUESTIONS OR CONCERNS REGARDING RADIATION THERAPY, PLEASE CALL [phone].

## 2025-03-12 ENCOUNTER — TELEPHONE (OUTPATIENT)
Dept: SURGERY | Facility: CLINIC | Age: 65
End: 2025-03-12

## 2025-03-12 DIAGNOSIS — Z51.81 MONITORING FOR ANTICOAGULANT USE: ICD-10-CM

## 2025-03-12 DIAGNOSIS — Z01.83 ENCOUNTER FOR BLOOD TYPING: ICD-10-CM

## 2025-03-12 DIAGNOSIS — Z79.01 MONITORING FOR ANTICOAGULANT USE: ICD-10-CM

## 2025-03-12 DIAGNOSIS — C61 PROSTATE CANCER (HCC): Primary | ICD-10-CM

## 2025-03-12 DIAGNOSIS — Z01.818 PREOPERATIVE TESTING: ICD-10-CM

## 2025-03-12 DIAGNOSIS — R82.90 ABNORMAL URINE FINDINGS: ICD-10-CM

## 2025-03-12 NOTE — TELEPHONE ENCOUNTER
Hi,    Can you please schedule this patient for surgery.    Can you arrange for this patient to have a CBC, BMP, PT/INR, type and screen, urine culture 2 weeks prior to surgery?    Thanks,  MB    Urology Surgery Request  Surgeon: Pawel Gould  Location (if known): [facility]  Procedure: Robot-assisted laparoscopic radical prostatectomy, bilateral pelvic lymphadenectomy  Anesthesia: General   Time Frame: Next available  Time required: 240 minutes  Diagnosis: Prostate cancer  Special Equipment: Robot    Antibiotics: per hospital protocol unless checked below   ___ Levaquin 500 mg IV   ___ Gemcitabine 2 g/100 mL NS bladder instillation to be given in OR    Estimated Post Op/Follow Up Appt:   10-14 days for void trial and post-op visit with me. Please schedule appointment at time of surgery scheduling.

## 2025-03-12 NOTE — TELEPHONE ENCOUNTER
Patient calling stating he decided to go for surgery and would like to schedule the surgery.Please advise

## 2025-03-13 ENCOUNTER — TELEPHONE (OUTPATIENT)
Dept: SURGERY | Facility: CLINIC | Age: 65
End: 2025-03-13

## 2025-03-13 NOTE — TELEPHONE ENCOUNTER
Spoke with pt, scheduled surgery for 5/7/25 with Dr Gould.  Surgery information sent to pt thru portal.    Pt aware to complete preoperative labs week of Aoril 21st, 2wks prior to surgery.  Phone no# to Central scheduling provided [phone].    Follow up scheduled for 5/19/25 at 1p with Dr. Gould

## 2025-03-13 NOTE — TELEPHONE ENCOUNTER
Called and left message to call back. Patient is to be scheduled for Robot case with  next available is May 7, 2025.

## 2025-04-21 ENCOUNTER — LAB ENCOUNTER (OUTPATIENT)
Dept: LAB | Facility: HOSPITAL | Age: 65
End: 2025-04-21
Attending: SURGERY
Payer: COMMERCIAL

## 2025-04-21 LAB
ANION GAP SERPL CALC-SCNC: 8 MMOL/L (ref 0–18)
ANTIBODY SCREEN: NEGATIVE
BASOPHILS # BLD AUTO: 0.02 X10(3) UL (ref 0–0.2)
BASOPHILS NFR BLD AUTO: 0.5 %
BUN BLD-MCNC: 18 MG/DL (ref 9–23)
CALCIUM BLD-MCNC: 9.5 MG/DL (ref 8.7–10.6)
CHLORIDE SERPL-SCNC: 106 MMOL/L (ref 98–112)
CO2 SERPL-SCNC: 26 MMOL/L (ref 21–32)
CREAT BLD-MCNC: 1.38 MG/DL (ref 0.7–1.3)
EGFRCR SERPLBLD CKD-EPI 2021: 56 ML/MIN/1.73M2 (ref 60–?)
EOSINOPHIL # BLD AUTO: 0.15 X10(3) UL (ref 0–0.7)
EOSINOPHIL NFR BLD AUTO: 3.5 %
ERYTHROCYTE [DISTWIDTH] IN BLOOD BY AUTOMATED COUNT: 14.3 %
FASTING STATUS PATIENT QL REPORTED: YES
GLUCOSE BLD-MCNC: 103 MG/DL (ref 70–99)
HCT VFR BLD AUTO: 44.9 % (ref 39–53)
HGB BLD-MCNC: 14.8 G/DL (ref 13–17.5)
IMM GRANULOCYTES # BLD AUTO: 0.01 X10(3) UL (ref 0–1)
IMM GRANULOCYTES NFR BLD: 0.2 %
INR BLD: 0.95 (ref 0.8–1.2)
LYMPHOCYTES # BLD AUTO: 1.86 X10(3) UL (ref 1–4)
LYMPHOCYTES NFR BLD AUTO: 43.5 %
MCH RBC QN AUTO: 27.2 PG (ref 26–34)
MCHC RBC AUTO-ENTMCNC: 33 G/DL (ref 31–37)
MCV RBC AUTO: 82.5 FL (ref 80–100)
MONOCYTES # BLD AUTO: 0.31 X10(3) UL (ref 0.1–1)
MONOCYTES NFR BLD AUTO: 7.2 %
NEUTROPHILS # BLD AUTO: 1.93 X10 (3) UL (ref 1.5–7.7)
NEUTROPHILS # BLD AUTO: 1.93 X10(3) UL (ref 1.5–7.7)
NEUTROPHILS NFR BLD AUTO: 45.1 %
OSMOLALITY SERPL CALC.SUM OF ELEC: 292 MOSM/KG (ref 275–295)
PLATELET # BLD AUTO: 225 10(3)UL (ref 150–450)
POTASSIUM SERPL-SCNC: 4 MMOL/L (ref 3.5–5.1)
PROTHROMBIN TIME: 12.7 SECONDS (ref 11.6–14.8)
RBC # BLD AUTO: 5.44 X10(6)UL (ref 3.8–5.8)
RH BLOOD TYPE: POSITIVE
SODIUM SERPL-SCNC: 140 MMOL/L (ref 136–145)
WBC # BLD AUTO: 4.3 X10(3) UL (ref 4–11)

## 2025-04-21 PROCEDURE — 80048 BASIC METABOLIC PNL TOTAL CA: CPT | Performed by: SURGERY

## 2025-04-21 PROCEDURE — 87086 URINE CULTURE/COLONY COUNT: CPT | Performed by: SURGERY

## 2025-04-21 PROCEDURE — 86901 BLOOD TYPING SEROLOGIC RH(D): CPT | Performed by: SURGERY

## 2025-04-21 PROCEDURE — 36415 COLL VENOUS BLD VENIPUNCTURE: CPT | Performed by: SURGERY

## 2025-04-21 PROCEDURE — 86900 BLOOD TYPING SEROLOGIC ABO: CPT | Performed by: SURGERY

## 2025-04-21 PROCEDURE — 85610 PROTHROMBIN TIME: CPT | Performed by: SURGERY

## 2025-04-21 PROCEDURE — 85025 COMPLETE CBC W/AUTO DIFF WBC: CPT | Performed by: SURGERY

## 2025-04-21 PROCEDURE — 87077 CULTURE AEROBIC IDENTIFY: CPT | Performed by: SURGERY

## 2025-04-21 PROCEDURE — 86850 RBC ANTIBODY SCREEN: CPT | Performed by: SURGERY

## 2025-04-23 ENCOUNTER — APPOINTMENT (OUTPATIENT)
Dept: ADMINISTRATIVE | Facility: HOSPITAL | Age: 65
End: 2025-04-23
Payer: COMMERCIAL

## 2025-04-23 ENCOUNTER — TELEPHONE (OUTPATIENT)
Dept: SURGERY | Facility: CLINIC | Age: 65
End: 2025-04-23

## 2025-04-23 RX ORDER — CEPHALEXIN 500 MG/1
500 CAPSULE ORAL 2 TIMES DAILY
Qty: 6 CAPSULE | Refills: 0 | Status: SHIPPED | OUTPATIENT
Start: 2025-04-23 | End: 2025-04-23 | Stop reason: ALTCHOICE

## 2025-04-23 NOTE — TELEPHONE ENCOUNTER
Per patient saw urine culture results were abnormal on mychart, patient would like clarification. Please call thank you.

## 2025-04-23 NOTE — TELEPHONE ENCOUNTER
Per patient calling stating he was prescribed an antibiotic by Dr. Gould for an infection- patient calling asking what type of infection he might have. Please call back.

## 2025-04-23 NOTE — TELEPHONE ENCOUNTER
This encounter is now closed.     RN called patient. Relayed urine culture and discussed new order of antibiotic. He agreed to plans. He will  ABT after getting off work.      Collected   4/21/2025  9:33 AM      URINE CULTURE 10,000 - 50,000 CFU/ML Staphylococcus species, not aureus  Specimen Collected: 04/21/25  9:33 AM Last Resulted: 04/23/25 11:31 AM           Medication Detail  Medication Quantity Refills Start End  cephALEXin 500 MG Oral Cap 6 capsule 0 4/23/2025 4/26/2025  Sig:   Take 1 capsule (500 mg total) by mouth 2 (two) times daily for 3 days.    Pharmacy  [id number] [place] [id number] AT [facility] [address], [phone], [phone]

## 2025-04-23 NOTE — TELEPHONE ENCOUNTER
This encounter is now closed.     RN called patient. No answer. Left message.  RN reached via [employer]     \"Can someone call and let him know to start Keflex 3 days prior to surgery?\"    Medication Detail    Medication Quantity Refills Start End   cephALEXin 500 MG Oral Cap 6 capsule 0 4/23/2025 4/26/2025   Sig:   Take 1 capsule (500 mg total) by mouth 2 (two) times daily for 3 days.       Pharmacy    [facility] #[id number] [place] [id number] AT [facility] [address], [phone], [phone]

## 2025-04-23 NOTE — TELEPHONE ENCOUNTER
This encounter is now closed.     RN spoke to patient already. See previous telephone encounter.

## 2025-04-24 ENCOUNTER — TELEPHONE (OUTPATIENT)
Dept: SURGERY | Facility: CLINIC | Age: 65
End: 2025-04-24

## 2025-04-24 NOTE — TELEPHONE ENCOUNTER
Per patient asking to speak to the nurse again to clarify what type of infection he has and why he needs to take a medication 3 days before his surgery if he has no symptoms. Please advise

## 2025-04-24 NOTE — TELEPHONE ENCOUNTER
This encounter is now closed.     RN called patient. He inquired why not take the antibiotic now if he is having infection. He reports no urinary symptoms.     RN explained the type of bacteria he has - a Staphylococcus (less dangerous) species only, not aureus (the common Staph Aureus - a harmful type of staph - needs careful treatment and often stronger ABT), but still something MD wants to treat to lower the risk of infection. The ABT was prescribed to be started 3 days before the surgery because that's the most effective timing to ensure the medication is working during the procedure. Taking it too early may reduce its benefit by the time of surgery. This approach helps lower his infection risk and protect his surgical outcome.     Patient was also asking the reason of his infection. RN explained a lot contributing factors. At this time, RN encouraged to continue to push fluids, monitor his urinary symptoms, empty his bladder completely, avoid constipation and do not hold urination.     He verbalized understanding and agreed to plans.       Collected 4/21/2025  9:33 AM     URINE CULTURE 10,000 - 50,000 CFU/ML Staphylococcus species, not aureus Abnormal

## 2025-04-25 NOTE — H&P
UROLOGY HISTORY & PHYSICAL      Jaylon Montano Patient Status:  Outpatient in a Bed    3/13/1960 MRN [id number]   Location [facility] SURGERY Attending Pawel Gould MD   Hosp Day # 0 PCP Ariel Trimble DO       Chief Complaint:   Prostate cancer     HPI & Assessment:   Jaylon Montano is a 65 year old male with history of DM2 and BPH/LUTS.     Last PSA 2.92 on 23. Prior to that it was 2.01.      Repeat PSA on 24 was 3.13. Plan was for 6 month repeat.     Good urinary stream. Occasional urinary frequency but high fluid intake. Nocturia 1-2 times per night. He does cut off fluids before bed time by 2-3 hours.    Repeat PSA 2024 was 4.2.     Prostate MRI on 2024 showed volume 26 cc, 1.6 cm PI-RADS 4 lesion with broad capsular contact in the left PZ mid gland/apex.     I performed MRI fusion prostate biopsy on 2025.  Pathology showed Guilford 4+3 prostate cancer in the right mid gland and region of interest, Guilford 3+4 prostate cancer in the left base and right base, Ruddy 3+3 in the right apex and left mid.     PSMA PET scan 2025 showed indeterminant pulmonary nodules up to 6 mm (not PET avid), no signs of metastatic disease.                 Location Guilford Score % of Pattern 4  Grade Group   Positive Cores / Total Cores  Tumor Length (mm)  % Tissue Involved       A. Right apex 3+3=6 - 1 1/3 1.6 5    B. Right mid 4+3=7 75 3 2/2 3.4 10    C. Left apex - - - - - -    D. Left mid 3+3=6 - 1 1/2 1.9 5    E. Left base 3+4=7 25 2 1/2 1.7 8    F. Region of interest 4+3=7 70 3 3/4 3.9 7    G. Right base 3+4=7 60 2 2/2 21.8 75     Urine culture growing 10-50 K staph, will pretreat with 3 days of antibiotics given likely skin contaminant.    Plan:   - OR for robotic radical prostatectomy, bilateral pelvic lymphadenectomy  - Informed consent obtained - risks and benefits explained, all questions answered       History:   Past Medical History[1]    Past Surgical  History[2]    Family History[3]    Short Social Hx on File[4]    Medications (Active prior to today's visit):  Current Medications[5]    Allergies:  Allergies[6]    Review of Systems:   A comprehensive 10-point review of systems was completed.  Pertinent positives and negatives are noted in the the HPI.    Physical Exam:   Vital Signs:  Height 5' 10\" (1.778 m), weight 194 lb (88 kg).     CONSTITUTIONAL: Well developed, well nourished, in no acute distress  NEUROLOGIC: Alert and oriented  HEAD: Normocephalic, atraumatic  EYES: Sclera non-icteric  ENT: Hearing intact, moist mucous membranes  NECK: No obvious goiter or masses  RESPIRATORY: Normal respiratory effort  SKIN: No evident rashes  ABDOMEN: Soft, non-tender, non-distended    Laboratory Data:  Lab Results   Component Value Date    WBC 4.3 04/21/2025    HGB 14.8 04/21/2025    .0 04/21/2025     Lab Results   Component Value Date     04/21/2025    K 4.0 04/21/2025     04/21/2025    CO2 26.0 04/21/2025    BUN 18 04/21/2025     (H) 04/21/2025    GFRAA 71 07/24/2021    AST 21 11/09/2024    ALT 24 11/09/2024    TP 7.3 11/09/2024    ALB 4.5 11/09/2024    CA 9.5 04/21/2025       Urinalysis Results (last three years):  Recent Labs     12/13/22  1656 12/13/22  1717 12/07/23  1435 12/07/23  1529 07/19/24  1331 03/04/25  1426   SPECGRAVITY 1.030  --  1.030  --  1.030 >=1.030   PHURINE 5.5  --  6.0  --  5.5 5.5   NITRITE Negative  --  Negative  --  Negative Negative   WBCUR  --  1-5  --  1-5  --   --    RBCUR  --  0-2  --  0-2  --   --    BACUR  --  None Seen  --  None Seen  --   --        Urine Culture Results (last three years):  Lab Results   Component Value Date    URINECUL (A) 04/21/2025     10,000 - 50,000 CFU/ML Staphylococcus species, not aureus    URINECUL No Growth 2 Days 01/23/2025    URINECUL  01/13/2025     <10,000 cfu/ml Mixture of Gram positive organisms isolated - probable contamination.       PSA:  Lab Results   Component Value Date     PSA 4.2 (H) 07/19/2024    PSA 1.52 01/22/2019    PSA 0.889 09/05/2013    PSAS 1.72 12/20/2017    PSAS 1.40 08/09/2016        Imaging (last three days):  No results found.       I have personally reviewed all relevant medical records, labs, and imaging.     Pawel Gould MD  Staff Urologist  [facility]  Office: [phone]             [1]   Past Medical History:   Decorative tattoo    Prediabetes    Prostate cancer (HCC)    Type 2 diabetes mellitus without complication, without long-term current use of insulin (HCC)    Wears glasses   [2]   Past Surgical History:  Procedure Laterality Date    Colonoscopy  10/17/2018    Pandolfi    [facility] biopsy prostate (cpt=76942/77108)     [3]   Family History  Problem Relation Age of Onset    Heart Disorder Father     Hypertension Father     Hypertension Mother     Hypertension Brother     Colon Polyps Brother    [4]   Social History  Socioeconomic History    Marital status:     Number of children: 5   Tobacco Use    Smoking status: Never    Smokeless tobacco: Never   Vaping Use    Vaping status: Never Used   Substance and Sexual Activity    Alcohol use: Yes     Alcohol/week: 3.0 standard drinks of alcohol     Types: 3 Cans of beer per week     Comment: beer once a month    Drug use: No    Sexual activity: Yes     Partners: Female   Social History Narrative    , lives with wife    Has 5 daughters, 3 in [place]   [5]   Current Outpatient Medications   Medication Sig Dispense Refill    Sildenafil Citrate 100 MG Oral Tab Take 1 tablet (100 mg total) by mouth daily as needed for Erectile Dysfunction. 30 tablet 3   [6]   Allergies  Allergen Reactions    Ace Inhibitors Coughing    Metformin DIARRHEA

## 2025-05-02 ENCOUNTER — TELEPHONE (OUTPATIENT)
Dept: CASE MANAGEMENT | Age: 65
End: 2025-05-02

## 2025-05-02 NOTE — TELEPHONE ENCOUNTER
Received call from Jet Arguello @ [facility] requesting clinicals and form to be re faxed to [phone] due too not receiving a legible copy.  Rep provided website to download form  [email or web address]    Rep also provided ph no# [phone]

## 2025-05-06 ENCOUNTER — ANESTHESIA EVENT (OUTPATIENT)
Dept: SURGERY | Facility: HOSPITAL | Age: 65
End: 2025-05-06
Payer: COMMERCIAL

## 2025-05-07 ENCOUNTER — HOSPITAL ENCOUNTER (OUTPATIENT)
Facility: HOSPITAL | Age: 65
Discharge: HOME | End: 2025-05-09
Attending: SURGERY | Admitting: SURGERY
Payer: COMMERCIAL

## 2025-05-07 ENCOUNTER — ANESTHESIA (OUTPATIENT)
Dept: SURGERY | Facility: HOSPITAL | Age: 65
End: 2025-05-07
Payer: COMMERCIAL

## 2025-05-07 DIAGNOSIS — C61 PROSTATE CANCER (HCC): Primary | ICD-10-CM

## 2025-05-07 LAB
ANION GAP SERPL CALC-SCNC: 17 MMOL/L (ref 0–18)
BUN BLD-MCNC: 15 MG/DL (ref 9–23)
CALCIUM BLD-MCNC: 8.7 MG/DL (ref 8.7–10.6)
CHLORIDE SERPL-SCNC: 103 MMOL/L (ref 98–112)
CO2 SERPL-SCNC: 21 MMOL/L (ref 21–32)
CREAT BLD-MCNC: 1.66 MG/DL (ref 0.7–1.3)
EGFRCR SERPLBLD CKD-EPI 2021: 45 ML/MIN/1.73M2 (ref 60–?)
ERYTHROCYTE [DISTWIDTH] IN BLOOD BY AUTOMATED COUNT: 14.3 %
GLUCOSE BLD-MCNC: 166 MG/DL (ref 70–99)
HCT VFR BLD AUTO: 43.7 % (ref 39–53)
HGB BLD-MCNC: 14.1 G/DL (ref 13–17.5)
MCH RBC QN AUTO: 27 PG (ref 26–34)
MCHC RBC AUTO-ENTMCNC: 32.3 G/DL (ref 31–37)
MCV RBC AUTO: 83.7 FL (ref 80–100)
OSMOLALITY SERPL CALC.SUM OF ELEC: 297 MOSM/KG (ref 275–295)
PLATELET # BLD AUTO: 232 10(3)UL (ref 150–450)
POTASSIUM SERPL-SCNC: 4.2 MMOL/L (ref 3.5–5.1)
RBC # BLD AUTO: 5.22 X10(6)UL (ref 3.8–5.8)
SODIUM SERPL-SCNC: 141 MMOL/L (ref 136–145)
WBC # BLD AUTO: 11.9 X10(3) UL (ref 4–11)

## 2025-05-07 PROCEDURE — 76942 ECHO GUIDE FOR BIOPSY: CPT | Performed by: STUDENT IN AN ORGANIZED HEALTH CARE EDUCATION/TRAINING PROGRAM

## 2025-05-07 PROCEDURE — 38571 LAPAROSCOPY LYMPHADENECTOMY: CPT | Performed by: SURGERY

## 2025-05-07 PROCEDURE — 38571 LAPAROSCOPY LYMPHADENECTOMY: CPT

## 2025-05-07 PROCEDURE — 55866 LAPS SURG PRST8ECT RPBIC RAD: CPT | Performed by: SURGERY

## 2025-05-07 PROCEDURE — 55866 LAPS SURG PRST8ECT RPBIC RAD: CPT

## 2025-05-07 RX ORDER — HYDROCODONE BITARTRATE AND ACETAMINOPHEN 5; 325 MG/1; MG/1
1 TABLET ORAL EVERY 4 HOURS PRN
Qty: 10 TABLET | Refills: 0 | Status: SHIPPED | OUTPATIENT
Start: 2025-05-07

## 2025-05-07 RX ORDER — KETOROLAC TROMETHAMINE 30 MG/ML
INJECTION, SOLUTION INTRAMUSCULAR; INTRAVENOUS AS NEEDED
Status: DISCONTINUED | OUTPATIENT
Start: 2025-05-07 | End: 2025-05-07 | Stop reason: SURG

## 2025-05-07 RX ORDER — PHENYLEPHRINE HCL 10 MG/ML
VIAL (ML) INJECTION AS NEEDED
Status: DISCONTINUED | OUTPATIENT
Start: 2025-05-07 | End: 2025-05-07 | Stop reason: SURG

## 2025-05-07 RX ORDER — DIPHENHYDRAMINE HYDROCHLORIDE 50 MG/ML
12.5 INJECTION, SOLUTION INTRAMUSCULAR; INTRAVENOUS AS NEEDED
Status: DISCONTINUED | OUTPATIENT
Start: 2025-05-07 | End: 2025-05-07 | Stop reason: HOSPADM

## 2025-05-07 RX ORDER — HYDROMORPHONE HYDROCHLORIDE 1 MG/ML
0.4 INJECTION, SOLUTION INTRAMUSCULAR; INTRAVENOUS; SUBCUTANEOUS EVERY 2 HOUR PRN
Status: ACTIVE | OUTPATIENT
Start: 2025-05-07 | End: 2025-05-08

## 2025-05-07 RX ORDER — MIDAZOLAM HYDROCHLORIDE 1 MG/ML
1 INJECTION INTRAMUSCULAR; INTRAVENOUS EVERY 5 MIN PRN
Status: DISCONTINUED | OUTPATIENT
Start: 2025-05-07 | End: 2025-05-07 | Stop reason: HOSPADM

## 2025-05-07 RX ORDER — LIDOCAINE HYDROCHLORIDE ANHYDROUS AND DEXTROSE MONOHYDRATE .8; 5 G/100ML; G/100ML
INJECTION, SOLUTION INTRAVENOUS CONTINUOUS PRN
Status: DISCONTINUED | OUTPATIENT
Start: 2025-05-07 | End: 2025-05-07 | Stop reason: SURG

## 2025-05-07 RX ORDER — HYDROMORPHONE HYDROCHLORIDE 1 MG/ML
0.4 INJECTION, SOLUTION INTRAMUSCULAR; INTRAVENOUS; SUBCUTANEOUS EVERY 5 MIN PRN
Status: DISCONTINUED | OUTPATIENT
Start: 2025-05-07 | End: 2025-05-07 | Stop reason: HOSPADM

## 2025-05-07 RX ORDER — GLYCOPYRROLATE 0.2 MG/ML
INJECTION, SOLUTION INTRAMUSCULAR; INTRAVENOUS AS NEEDED
Status: DISCONTINUED | OUTPATIENT
Start: 2025-05-07 | End: 2025-05-07 | Stop reason: SURG

## 2025-05-07 RX ORDER — ROCURONIUM BROMIDE 10 MG/ML
INJECTION, SOLUTION INTRAVENOUS AS NEEDED
Status: DISCONTINUED | OUTPATIENT
Start: 2025-05-07 | End: 2025-05-07 | Stop reason: SURG

## 2025-05-07 RX ORDER — BUPIVACAINE HYDROCHLORIDE 2.5 MG/ML
INJECTION, SOLUTION EPIDURAL; INFILTRATION; INTRACAUDAL; PERINEURAL AS NEEDED
Status: DISCONTINUED | OUTPATIENT
Start: 2025-05-07 | End: 2025-05-07 | Stop reason: HOSPADM

## 2025-05-07 RX ORDER — OXYBUTYNIN CHLORIDE 5 MG/1
5 TABLET ORAL EVERY 6 HOURS PRN
Status: DISCONTINUED | OUTPATIENT
Start: 2025-05-07 | End: 2025-05-09

## 2025-05-07 RX ORDER — SODIUM CHLORIDE 9 MG/ML
INJECTION, SOLUTION INTRAVENOUS CONTINUOUS PRN
Status: DISCONTINUED | OUTPATIENT
Start: 2025-05-07 | End: 2025-05-07 | Stop reason: SURG

## 2025-05-07 RX ORDER — MEPERIDINE HYDROCHLORIDE 25 MG/ML
12.5 INJECTION INTRAMUSCULAR; INTRAVENOUS; SUBCUTANEOUS AS NEEDED
Status: DISCONTINUED | OUTPATIENT
Start: 2025-05-07 | End: 2025-05-07 | Stop reason: HOSPADM

## 2025-05-07 RX ORDER — OXYCODONE HYDROCHLORIDE 10 MG/1
10 TABLET ORAL EVERY 4 HOURS PRN
Status: DISCONTINUED | OUTPATIENT
Start: 2025-05-07 | End: 2025-05-09

## 2025-05-07 RX ORDER — ONDANSETRON 2 MG/ML
4 INJECTION INTRAMUSCULAR; INTRAVENOUS EVERY 6 HOURS PRN
Status: DISCONTINUED | OUTPATIENT
Start: 2025-05-07 | End: 2025-05-07 | Stop reason: HOSPADM

## 2025-05-07 RX ORDER — OXYCODONE HYDROCHLORIDE 5 MG/1
5 TABLET ORAL EVERY 4 HOURS PRN
Status: DISCONTINUED | OUTPATIENT
Start: 2025-05-07 | End: 2025-05-09

## 2025-05-07 RX ORDER — HEPARIN SODIUM 5000 [USP'U]/ML
5000 INJECTION, SOLUTION INTRAVENOUS; SUBCUTANEOUS ONCE
Status: COMPLETED | OUTPATIENT
Start: 2025-05-07 | End: 2025-05-07

## 2025-05-07 RX ORDER — HEPARIN SODIUM 5000 [USP'U]/ML
5000 INJECTION, SOLUTION INTRAVENOUS; SUBCUTANEOUS EVERY 8 HOURS SCHEDULED
Status: DISCONTINUED | OUTPATIENT
Start: 2025-05-07 | End: 2025-05-09

## 2025-05-07 RX ORDER — ACETAMINOPHEN 500 MG
1000 TABLET ORAL ONCE
Status: DISCONTINUED | OUTPATIENT
Start: 2025-05-07 | End: 2025-05-07 | Stop reason: HOSPADM

## 2025-05-07 RX ORDER — SENNOSIDES 8.6 MG
17.2 TABLET ORAL NIGHTLY
Status: DISCONTINUED | OUTPATIENT
Start: 2025-05-07 | End: 2025-05-09

## 2025-05-07 RX ORDER — METOCLOPRAMIDE HYDROCHLORIDE 5 MG/ML
10 INJECTION INTRAMUSCULAR; INTRAVENOUS EVERY 8 HOURS PRN
Status: DISCONTINUED | OUTPATIENT
Start: 2025-05-07 | End: 2025-05-07

## 2025-05-07 RX ORDER — ACETAMINOPHEN 500 MG
1000 TABLET ORAL EVERY 8 HOURS SCHEDULED
Status: DISCONTINUED | OUTPATIENT
Start: 2025-05-07 | End: 2025-05-09

## 2025-05-07 RX ORDER — OXYCODONE HYDROCHLORIDE 5 MG/1
10 TABLET ORAL ONCE AS NEEDED
Status: DISCONTINUED | OUTPATIENT
Start: 2025-05-07 | End: 2025-05-07 | Stop reason: HOSPADM

## 2025-05-07 RX ORDER — SODIUM CHLORIDE, SODIUM LACTATE, POTASSIUM CHLORIDE, CALCIUM CHLORIDE 600; 310; 30; 20 MG/100ML; MG/100ML; MG/100ML; MG/100ML
INJECTION, SOLUTION INTRAVENOUS CONTINUOUS
Status: DISCONTINUED | OUTPATIENT
Start: 2025-05-07 | End: 2025-05-07

## 2025-05-07 RX ORDER — LIDOCAINE HYDROCHLORIDE 10 MG/ML
INJECTION, SOLUTION EPIDURAL; INFILTRATION; INTRACAUDAL; PERINEURAL AS NEEDED
Status: DISCONTINUED | OUTPATIENT
Start: 2025-05-07 | End: 2025-05-07 | Stop reason: SURG

## 2025-05-07 RX ORDER — HYDROMORPHONE HYDROCHLORIDE 1 MG/ML
0.8 INJECTION, SOLUTION INTRAMUSCULAR; INTRAVENOUS; SUBCUTANEOUS EVERY 2 HOUR PRN
Status: ACTIVE | OUTPATIENT
Start: 2025-05-07 | End: 2025-05-08

## 2025-05-07 RX ORDER — KETOROLAC TROMETHAMINE 15 MG/ML
15 INJECTION, SOLUTION INTRAMUSCULAR; INTRAVENOUS EVERY 8 HOURS PRN
Status: DISCONTINUED | OUTPATIENT
Start: 2025-05-07 | End: 2025-05-09

## 2025-05-07 RX ORDER — KETAMINE HYDROCHLORIDE 50 MG/ML
INJECTION, SOLUTION INTRAMUSCULAR; INTRAVENOUS AS NEEDED
Status: DISCONTINUED | OUTPATIENT
Start: 2025-05-07 | End: 2025-05-07 | Stop reason: SURG

## 2025-05-07 RX ORDER — ONDANSETRON 2 MG/ML
INJECTION INTRAMUSCULAR; INTRAVENOUS AS NEEDED
Status: DISCONTINUED | OUTPATIENT
Start: 2025-05-07 | End: 2025-05-07 | Stop reason: SURG

## 2025-05-07 RX ORDER — HYDROMORPHONE HYDROCHLORIDE 1 MG/ML
0.6 INJECTION, SOLUTION INTRAMUSCULAR; INTRAVENOUS; SUBCUTANEOUS EVERY 5 MIN PRN
Status: DISCONTINUED | OUTPATIENT
Start: 2025-05-07 | End: 2025-05-07 | Stop reason: HOSPADM

## 2025-05-07 RX ORDER — NALOXONE HYDROCHLORIDE 0.4 MG/ML
0.08 INJECTION, SOLUTION INTRAMUSCULAR; INTRAVENOUS; SUBCUTANEOUS AS NEEDED
Status: DISCONTINUED | OUTPATIENT
Start: 2025-05-07 | End: 2025-05-07 | Stop reason: HOSPADM

## 2025-05-07 RX ORDER — DOCUSATE SODIUM 100 MG/1
100 CAPSULE, LIQUID FILLED ORAL 2 TIMES DAILY
Status: DISCONTINUED | OUTPATIENT
Start: 2025-05-07 | End: 2025-05-09

## 2025-05-07 RX ORDER — METOCLOPRAMIDE HYDROCHLORIDE 5 MG/ML
5 INJECTION INTRAMUSCULAR; INTRAVENOUS EVERY 8 HOURS PRN
Status: DISCONTINUED | OUTPATIENT
Start: 2025-05-07 | End: 2025-05-09

## 2025-05-07 RX ORDER — HYDROMORPHONE HYDROCHLORIDE 1 MG/ML
0.2 INJECTION, SOLUTION INTRAMUSCULAR; INTRAVENOUS; SUBCUTANEOUS EVERY 5 MIN PRN
Status: DISCONTINUED | OUTPATIENT
Start: 2025-05-07 | End: 2025-05-07 | Stop reason: HOSPADM

## 2025-05-07 RX ORDER — POLYETHYLENE GLYCOL 3350 17 G/17G
17 POWDER, FOR SOLUTION ORAL DAILY PRN
Qty: 20 PACKET | Refills: 1 | Status: SHIPPED | OUTPATIENT
Start: 2025-05-07

## 2025-05-07 RX ORDER — OXYCODONE HYDROCHLORIDE 5 MG/1
5 TABLET ORAL ONCE AS NEEDED
Status: DISCONTINUED | OUTPATIENT
Start: 2025-05-07 | End: 2025-05-07 | Stop reason: HOSPADM

## 2025-05-07 RX ORDER — POLYETHYLENE GLYCOL 3350 17 G/17G
17 POWDER, FOR SOLUTION ORAL DAILY PRN
Status: DISCONTINUED | OUTPATIENT
Start: 2025-05-07 | End: 2025-05-09

## 2025-05-07 RX ORDER — NEOSTIGMINE METHYLSULFATE 1 MG/ML
INJECTION INTRAVENOUS AS NEEDED
Status: DISCONTINUED | OUTPATIENT
Start: 2025-05-07 | End: 2025-05-07 | Stop reason: SURG

## 2025-05-07 RX ORDER — ONDANSETRON 2 MG/ML
4 INJECTION INTRAMUSCULAR; INTRAVENOUS EVERY 6 HOURS PRN
Status: DISCONTINUED | OUTPATIENT
Start: 2025-05-07 | End: 2025-05-09

## 2025-05-07 RX ORDER — FAMOTIDINE 20 MG/1
20 TABLET, FILM COATED ORAL DAILY
Status: DISCONTINUED | OUTPATIENT
Start: 2025-05-07 | End: 2025-05-09

## 2025-05-07 RX ORDER — LABETALOL HYDROCHLORIDE 5 MG/ML
5 INJECTION, SOLUTION INTRAVENOUS EVERY 5 MIN PRN
Status: DISCONTINUED | OUTPATIENT
Start: 2025-05-07 | End: 2025-05-07 | Stop reason: HOSPADM

## 2025-05-07 RX ORDER — ACETAMINOPHEN 10 MG/ML
INJECTION, SOLUTION INTRAVENOUS AS NEEDED
Status: DISCONTINUED | OUTPATIENT
Start: 2025-05-07 | End: 2025-05-07 | Stop reason: SURG

## 2025-05-07 RX ORDER — SODIUM CHLORIDE, SODIUM LACTATE, POTASSIUM CHLORIDE, CALCIUM CHLORIDE 600; 310; 30; 20 MG/100ML; MG/100ML; MG/100ML; MG/100ML
INJECTION, SOLUTION INTRAVENOUS CONTINUOUS
Status: DISCONTINUED | OUTPATIENT
Start: 2025-05-07 | End: 2025-05-07 | Stop reason: HOSPADM

## 2025-05-07 RX ORDER — FAMOTIDINE 10 MG/ML
20 INJECTION, SOLUTION INTRAVENOUS DAILY
Status: DISCONTINUED | OUTPATIENT
Start: 2025-05-07 | End: 2025-05-09

## 2025-05-07 RX ADMIN — ACETAMINOPHEN 1000 MG: 10 INJECTION, SOLUTION INTRAVENOUS at 11:11:00

## 2025-05-07 RX ADMIN — ROCURONIUM BROMIDE 10 MG: 10 INJECTION, SOLUTION INTRAVENOUS at 09:26:00

## 2025-05-07 RX ADMIN — SODIUM CHLORIDE, SODIUM LACTATE, POTASSIUM CHLORIDE, CALCIUM CHLORIDE: 600; 310; 30; 20 INJECTION, SOLUTION INTRAVENOUS at 07:31:00

## 2025-05-07 RX ADMIN — PHENYLEPHRINE HCL 100 MCG: 10 MG/ML VIAL (ML) INJECTION at 09:41:00

## 2025-05-07 RX ADMIN — KETOROLAC TROMETHAMINE 15 MG: 30 INJECTION, SOLUTION INTRAMUSCULAR; INTRAVENOUS at 11:11:00

## 2025-05-07 RX ADMIN — ROCURONIUM BROMIDE 70 MG: 10 INJECTION, SOLUTION INTRAVENOUS at 07:39:00

## 2025-05-07 RX ADMIN — GLYCOPYRROLATE 0.4 MG: 0.2 INJECTION, SOLUTION INTRAMUSCULAR; INTRAVENOUS at 11:27:00

## 2025-05-07 RX ADMIN — ROCURONIUM BROMIDE 10 MG: 10 INJECTION, SOLUTION INTRAVENOUS at 08:34:00

## 2025-05-07 RX ADMIN — SODIUM CHLORIDE, SODIUM LACTATE, POTASSIUM CHLORIDE, CALCIUM CHLORIDE: 600; 310; 30; 20 INJECTION, SOLUTION INTRAVENOUS at 11:30:00

## 2025-05-07 RX ADMIN — KETAMINE HYDROCHLORIDE 25 MG: 50 INJECTION, SOLUTION INTRAMUSCULAR; INTRAVENOUS at 07:59:00

## 2025-05-07 RX ADMIN — NEOSTIGMINE METHYLSULFATE 3.5 MG: 1 INJECTION INTRAVENOUS at 11:27:00

## 2025-05-07 RX ADMIN — LIDOCAINE HYDROCHLORIDE ANHYDROUS AND DEXTROSE MONOHYDRATE 1.5 MG/KG/HR: .8; 5 INJECTION, SOLUTION INTRAVENOUS at 07:48:00

## 2025-05-07 RX ADMIN — ONDANSETRON 4 MG: 2 INJECTION INTRAMUSCULAR; INTRAVENOUS at 11:11:00

## 2025-05-07 RX ADMIN — LIDOCAINE HYDROCHLORIDE 50 MG: 10 INJECTION, SOLUTION EPIDURAL; INFILTRATION; INTRACAUDAL; PERINEURAL at 07:38:00

## 2025-05-07 RX ADMIN — SODIUM CHLORIDE: 9 INJECTION, SOLUTION INTRAVENOUS at 07:44:00

## 2025-05-07 NOTE — ANESTHESIA POSTPROCEDURE EVALUATION
[facility]    Jaylon Montano Patient Status:  Outpatient in a Bed   Age/Gender 65 year old male MRN [id number]   Location [facility] POST ANESTHESIA CARE UNIT Attending Pawel Gould MD   Hosp Day # 0 PCP Ariel Trimble DO       Anesthesia Post-op Note    Robot-assisted laparoscopic radical prostatectomy, bilateral pelvic lymphadenectomy    Procedure Summary       Date: 05/07/25 Room / Location:  MAIN OR [address] [place] [address] MAIN OR    Anesthesia Start: 0730 Anesthesia Stop: 1147    Procedure: Robot-assisted laparoscopic radical prostatectomy, bilateral pelvic lymphadenectomy (Bilateral) Diagnosis:       Prostate cancer (HCC)      (Prostate cancer (HCC) [C61])    Surgeons: Pawel Gould MD Anesthesiologist: Geoffrey Wilson MD    Anesthesia Type: general ASA Status: 3            Anesthesia Type: general    Vitals Value Taken Time   /66 05/07/25 11:48   Temp 98.4 05/07/25 11:50   Pulse 73 05/07/25 11:49   Resp 18 05/07/25 11:49   SpO2 100 % 05/07/25 11:49   Vitals shown include unfiled device data.        Patient Location: PACU    Anesthesia Type: general    Airway Patency: patent    Postop Pain Control: adequate    Mental Status: mildly sedated but able to meaningfully participate in the post-anesthesia evaluation    Nausea/Vomiting: none    Cardiopulmonary/Hydration status: stable euvolemic    Complications: no apparent anesthesia related complications    Postop vital signs: stable    Dental Exam: Unchanged from Preop    Patient to be discharged from PACU when criteria met.

## 2025-05-07 NOTE — PROGRESS NOTES
NURSING ADMISSION NOTE      Patient admitted via  bed from PACU  Oriented to room.  Safety precautions initiated.  Bed in low position.  Call light in reach.    Alert & oriented x4.With tolerable pain .With abdominal lap.sites x5 with dermabond c/d/I.Left ARGELIA to bulb suction intact.Bagley catheter intact draining dark yellow urine.Plan of care discussed with patient.All questions and concerns addressed.2 person skin assessment done with Dianca PCT --skin intact.

## 2025-05-07 NOTE — ANESTHESIA PROCEDURE NOTES
Airway  Date/Time: 5/7/2025 7:41 AM  Reason: elective      General Information and Staff   Patient location during procedure: OR  Anesthesiologist: Geoffrey Wilson MD  Resident/CRNA: Mario Carlos CRNA  Performed: CRNA   Performed by: Mario Carlos CRNA  Authorized by: Geoffrey Wilson MD        Indications and Patient Condition  Indications for airway management: anesthesia  Sedation level: deep      Preoxygenated: yesPatient position: sniffing    Mask difficulty assessment: 1 - vent by mask    Final Airway Details    Final airway type: endotracheal airway    Successful airway: ETT  Cuffed: yes   Successful intubation technique: direct laryngoscopy  Facilitating devices/methods: cricoid pressure  Endotracheal tube insertion site: oral  Blade: Akila  Blade size: #4  ETT size (mm): 8.0    Cormack-Lehane Classification: grade III - view of epiglottis only  Placement verified by: capnometry   Measured from: lips  ETT to lips (cm): 22  Number of attempts at approach: 1

## 2025-05-07 NOTE — ANESTHESIA PREPROCEDURE EVALUATION
PRE-OP EVALUATION    Patient Name: Jaylon Montano    Admit Diagnosis: Prostate cancer (HCC) [C61]    Pre-op Diagnosis: Prostate cancer (HCC) [C61]    Robot-assisted laparoscopic radical prostatectomy, bilateral pelvic lymphadenectomy    Anesthesia Procedure: Robot-assisted laparoscopic radical prostatectomy, bilateral pelvic lymphadenectomy (Bilateral)    Surgeons and Role:     * Pawel Gould MD - Primary    Pre-op vitals reviewed.  Temp: 97.8 °F (36.6 °C)  Pulse: 92  Resp: 16  BP: 169/97  SpO2: 98 %  Body mass index is 27 kg/m².    Current medications reviewed.  Hospital Medications:  Current Medications[1]    Outpatient Medications:   Prescriptions Prior to Admission[2]    Allergies: Ace inhibitors and Metformin      Anesthesia Evaluation    Patient summary reviewed.    Anesthetic Complications  (-) history of anesthetic complications         GI/Hepatic/Renal             (+) chronic renal disease and CRI                   Cardiovascular                     (+) hyperlipidemia                                  Endo/Other    Negative endo/other ROS.  Diabetes: Chart lists DM.  Pt is unaware of such a diagnosis.  Glucose 103 on recent labs.                            Pulmonary    Negative pulmonary ROS.                       Neuro/Psych    Negative neuro/psych ROS.                                Past Surgical History[3]  Social Hx on file[4]  History   Drug Use No     Available pre-op labs reviewed.  Lab Results   Component Value Date    WBC 4.3 04/21/2025    RBC 5.44 04/21/2025    HGB 14.8 04/21/2025    HCT 44.9 04/21/2025    MCV 82.5 04/21/2025    MCH 27.2 04/21/2025    MCHC 33.0 04/21/2025    RDW 14.3 04/21/2025    .0 04/21/2025     Lab Results   Component Value Date     04/21/2025    K 4.0 04/21/2025     04/21/2025    CO2 26.0 04/21/2025    BUN 18 04/21/2025    CREATSERUM 1.38 (H) 04/21/2025     (H) 04/21/2025    CA 9.5 04/21/2025     Lab Results   Component Value Date    INR 0.95  04/21/2025         Airway      Mallampati: II  Mouth opening: >3 FB  TM distance: > 6 cm  Neck ROM: full Cardiovascular      Rhythm: regular  Rate: normal     Dental  Comment: Multiple teeth missing.  However, no obvious evidence of dental disease in remaining teeth    Dental appliance(s): partials       Pulmonary      Breath sounds clear to auscultation bilaterally.               Other findings        ASA: 3   Plan: general  NPO status verified and patient meets guidelines.    Post-procedure pain management plan discussed with surgeon and patient.  Surgeon requests: regional block  Comment: TAP blocks discussed as part of the perioperative multimodal analgesic plan.  All anesthetic related questions were addressed.  Pt expressed understanding of and agreement with the anesthetic plan.      Plan/risks discussed with: spouse and patient              Present on Admission:  **None**               [1]  • acetaminophen (Tylenol Extra Strength) tab 1,000 mg  1,000 mg Oral Once   • lactated ringers infusion   Intravenous Continuous   • [COMPLETED] heparin (Porcine) 5000 UNIT/ML injection 5,000 Units  5,000 Units Subcutaneous Once   • ceFAZolin (Ancef) 2g in 10mL IV syringe premix  2 g Intravenous Once   [2]  Medications Prior to Admission   Medication Sig Dispense Refill Last Dose/Taking   • Sildenafil Citrate 100 MG Oral Tab Take 1 tablet (100 mg total) by mouth daily as needed for Erectile Dysfunction. 30 tablet 3 4/22/2025   [3]  Past Surgical History:  Procedure Laterality Date   • Colonoscopy  10/17/2018    Pandolfi   • [facility] biopsy prostate (cpt=76942/30222)     [4]  Social History  Socioeconomic History   • Marital status:    • Number of children: 5   Tobacco Use   • Smoking status: Never   • Smokeless tobacco: Never   Vaping Use   • Vaping status: Never Used   Substance and Sexual Activity   • Alcohol use: Yes     Alcohol/week: 3.0 standard drinks of alcohol     Types: 3 Cans of beer per week     Comment: beer  once a month   • Drug use: No   • Sexual activity: Yes     Partners: Female

## 2025-05-07 NOTE — ANESTHESIA PROCEDURE NOTES
Peripheral IV  Date/Time: 5/7/2025 7:41 AM  Inserted by: Mario Carlos CRNA    Placement  Needle size: 18 G  Laterality: right  Location: forearm  Local anesthetic: none  Site prep: alcohol  Technique: anatomical landmarks  Attempts: 1

## 2025-05-07 NOTE — OPERATIVE REPORT
Urology Operative Note    Attending Surgeon: Pawel Gould MD    Assistant: TOM Rubi    Patient Name: Jaylon Montano    Date of Surgery: 5/7/2025    Preoperative Diagnosis: Prostate cancer    Postoperative Diagnosis: Same    Procedure Performed:   1) Robot-assisted laparoscopic radical retropubic prostatectomy  2) Bilateral pelvic lymphadenectomy    Indication:  Patient is a 65 year old male who presented with elevated PSA and was found to have unfavorable intermediate risk prostate cancer on biopsy. The patient was counseled on options, risks, and benefits (mainly active surveillance, radiation, or surgery) and elected to undergo the above procedure. We discussed risks including, but not limited to, bleeding, infection, damage to surrounding structures, need for repeat procedure(s), erectile dysfunction, urinary incontinence, conversion to open procedure, and need to abort the procedure. The patient understood these risks and wished to proceed with surgery.    Findings:  Right nerve-sparing given concern for left extracapsular extension on MRI. Water-tight vesicourethral anastomosis tested with bladder filling.     Procedure:  The patient was taken to the operating room and a timeout was performed confirming the correct patient and procedure.  The patient underwent general anesthesia with endotracheal tube.  An orogastric tube was placed to suction.  The patient was prepped and draped in supine position. We ensured the patient was well padded and secured to the bed for eventual Trendelenburg position.  Subcutaneous Heparin was injected in the pre-op area. Pre-operative prophylactic antibiotics were given in the form of Ancef. Anesthesia performed bilateral TAP blocks.    The Veress needle was inserted into the abdomen and confirmed to be in the correct position with aspiration/irrigation of saline and low opening pressure.  The abdomen was insufflated to 15 mmHg.  The first port was placed 2-3 cm above  the umbilicus in the midline. The robotic zero-degree camera was inserted. There was no injury to surrounding structures within the abdomen.  The Veress needle was visualized and did not injure anything, so was then removed.  The insufflation was switched to the port in place and the remaining ports were placed under direct visualization, including two 8 mm robotic ports spaced 8 cm apart lateral to the umbilicus on the left, one 8 mm robotic port 8 cm lateral to the umbilicus on the left, and one 12 mm Airseal port 16 cm lateral to the umbilicus on the right. An additional 5 mm assistant port was placed superior and lateral to the camera port.    The patient was then slowly placed into 25 degrees of Trendelenburg and the robot was docked. Instruments were attached, from patient left to right these were Prograsp, [place] bipolar, zero degree camera, and monopolar scissors.    First, the bowels were pushed cranially. The sigmoid colon sidewall adhesions were taken down sharply to allow the bowels to be more free.     The posterior dissection was begun by incising into the peritoneum a few centimeters above the rectum. Bilateral vas deferens and seminal vesicles were dissected free using a combination or cautery and blunt dissection. The vas deferens were transected, taking care to cauterize any vasal blood supply running with it. Then, I held the seminal vesicles and prostate up and incised into Denonvillier's fascia. I bluntly dissected towards the apex of the prostate creating a space behind the prostate and dissecting the posterior prostate free.    I incised bilateral medial umbilical ligaments with cautery and dropped the bladder from the anterior abdominal wall. Dissection was taken down behind the pubic bone until the anterior surface of the prostate was seen. Then the anterior prostate fat was dissected off the prostate and passed off the field. I incised the endopelvic fascia on each side to free the  lateral sides of the prostate and dissected towards the apex of the prostate. Once the apex and dorsal venous complex were isolated I used a 0-Vicryl suture to suture ligate the dorsal venous complex in figure-of-eight fashion.    Using cautery I dissected the anterior bladder neck off of the prostate until the bladder neck was entered. Once in the bladder the Bagley balloon was deflated and the Bagley was grasped robotically and lifted anteriorly to assist in the dissection of the remainder of the bladder neck off the prostate.     Once through the posterior bladder neck I entered the space previously created during the posterior dissection. The vasa and seminal vesicles were pulled through the hole and lifted anteriorly to assist in the pedicle dissection. The prostate pedicles were ligated with a combination of Weck clips and bipolar cautery.     Partial nerve-sparing was then performed on the right by performing a high lateral facial release and peeling the neurovascular bundles off the prostate carefully from base towards apex without use of cautery.    I cut through the previously ligated dorsal venous complex with cautery and hemostasis was excellent. The remaining apical prostatic attachments were dissected free. I cut through the urethra without cautery close to the apex of the prostate to maximize urethral length. The prostate was free, and placed into an Endocatch bag and placed out of view.    The right iliac vein was found and the lymph node packet was freed from the medial/posterior edge of the vein until the pelvic side wall and obturator nerve were encountered. A combination of cautery and Weck clips were used for this dissection. Once the packet was freed it was passed off the field for specimen via the Airseal port and Surgicel was placed in the area. The same procedure was repeated on the left side, with care taken to identify the obturator nerve and keep it out of harms way. Surgicel was again  placed.    I performed the anastomosis using 3-0 V-lock interlocking sutures in a running fashion, starting posteriorly and then finishing anteriorly. After the anastomosis was complete the final 20 [place] Bagley catheter was placed without resistance and the balloon was filled with 15 mL sterile water. We filled the bladder with 120 mL saline and no anastomotic leak was present. The bladder was then drained and the urine was clear.    The insufflation pressure was dropped to 8 mmHg and I ensured good hemostasis in the pelvis. A Gavino drain was placed through the left lateral-most port and secured with a drain stitch. The robot was undocked. The midline incision was extended and the prostate and seminal vesicle specimen were extracted via this incision.     Trendelenburg positioning was reversed. The transverse midline incision was extended laterally in both directions to remove the specimen. I closed the fascia with interrupted figure of eight 0-Vicryl sutures. The abdomen was re-insufflated and we looked back inside with the camera. Hemostasis remained excellent and the fascial closure looked intact with no attached bowel or omentum. The 12 mm Airseal port was closed using 0-Vicryl and a fascial closure device. The insufflation was evacuated and all ports were removed. I closed the ports with running subcuticular 4-0 Monocryl. I secured the drain with a 3-0 Nylon drain stitch.    Local anesthesia was given to each port site and th extraction site in the form of 30 mL of 0.25% Marcaine. The abdomen was cleaned and dried. Surgical skin glue was applied to each incision.    The patient was awoken from anesthesia and transported to PACU in stable condition. He tolerated the procedure well. All instrument/supply counts were correct at the end of the case.    Specimens:   1) Anterior prostatic fat  2) Left pelvic lymph nodes  3) Right pelvic lymph nodes  4) Prostate and seminal vesicles    Estimated Blood Loss:  50  mL.    Tubes/Drains:  20 English Bagley catheter, abdominal Gavino drain    Complications:   None immediate    Condition from OR:  Stable    Plan:   Admit to floor 1-2 nights. Gavino drain out before discharge if low output. Bagley catheter to remain in place for 14 days.      Pawel Gould MD  Staff Urologist  [facility]  Office: [phone]

## 2025-05-07 NOTE — DISCHARGE INSTRUCTIONS
You had a laparoscopic/robotic prostate surgery in the operating room.    Instructions:    Follow-Up:    Your follow-up appointment is listed below. Please contact us at [phone] if you need to change your appointment.    Your appointments       Date & Time Appointment Department ([place])    May 19, 2025 1:00 PM CDT Post Op Visit with Pawel Gould MD [facility], [facility] ([facility] 4)        Jul 14, 2025 3:00 PM CDT Follow Up Visit with Pawel Gould MD [facility], [facility] ([facility] 4)              [facility] 4  [address] [facility] [address]  [facility] [id number] [phone]           Activity:    No heavy lifting > 15 lbs or strenuous activity for 6 weeks, to avoid risk for hernia. This includes activities such as golf, tennis, mowing the lawn, lifting weights, and running.    No bike riding (including stationary bikes) for 6 weeks.    You should walk around often after surgery (at least 6 times per day). Even if you are sore, you should avoid laying down all day as this can put you at risk for complications including blood clots, pneumonia, and constipation.     You may shower starting 2 days after surgery. Let the soapy water run over the incisions and do not scrub them. You have dissolvable stitches under the skin and surgical skin glue on the incisions. The glue will flake off within 1-2 weeks. You should not soak, bathe, or swim for 10-14 days.    Do not drive until you are off narcotic pain medications and your pain is gone. This typically takes 1-2 weeks. Avoid long car rides over 3-4 hours for the next month, as this can increase your blood clot risk.    Diet:    Eat light meals for the first few days after surgery, and focus on good fluid intake instead to avoid dehydration.     Try to drink at least 6-8 glasses of water per day. Avoid soda or carbonated  beverages for the first few days after surgery.    Medications:    You may experience pain after the procedure for a few days.  If pain becomes intolerable please contact our office or go to the nearest Emergency Room/Immediate Care. You make take over-the counter Tylenol/Acetaminophen for mild pain.  For pain not controlled by Tylenol, you may take the prescribed narcotic pain medication oxycodone. Try to stop any narcotic pain medications as soon as possible after surgery when your pain improves, as they can be addictive and cause constipation.    Do not take more than 4000 mg of Tylenol per day. If you have any liver problems, talk with your primary care doctor about your maximum Tylenol limit, as it may be less than 4000 mg/day.    You may take Ibuprofen/Motrin/Advil 200-400 mg every 8 hours as needed for pain with plenty of fluids. You may alternate this with Tylenol, and take this in addition to other prescribed pain medications.    Take the prescribed MiraLAX to soften your stool.  Once you are off narcotic pain medication and having daily, soft bowel movements you can stop this medication.    If you take blood thinners (such as aspirin, plavix, coumadin, xarelto, etc) please DO NOT take them when you go home. You may resume these medications in 7 days.    Bagley Catheter Care:    You will be discharged from the hospital with a Bagley catheter in place which continuously drains urine from your bladder.  It must stay in place while your anastomosis heals.  Do not attempt to remove this on your own.  If it should accidentally fall out, you MUST IMMEDIATELY notify your urologist to have it replaced.  Do NOT allow a non-urologist (even if they are a nurse or a doctor) to replace it without speaking to someone from our office first.    You may use a small amount of Vaseline or antibiotic ointment to lubricate the outside catheter where it enters the tip of your penis (the urethral meatus) if this area becomes dry or  irritated.    You will be provided with a \"stat-lock,\" a plastic clip which will be glued to your thigh to hold the catheter. This will be removed when your catheter is removed.    You will be provided with two urine collection bags of different sizes. Please use the larger bag while at home and sleeping. You may use the smaller leg bag while ambulating outside of the house. The leg bag usually lasts about 3-4 hours before needing to be emptied, but of course this varies with how much liquid you consume.  The larger bag should last you all night, so you do not need to wake up to empty it.  Remove, empty, and exchange these two bags as needed.    Please keep Bagley bag below the level of your bladder to allow urine to drain properly and to prevent urinary tract infection.    Alert the surgeon if the catheter does not drain well, or if you have any other serious problems with it.    Regaining Urinary Control:    Most men have difficulty with urinary control after catheter removal and leak urine with coughing/sneezing/lifting.  You should bring an adult urinary pad (such as Depend Guards) with you the day your catheter is removed.  You should be prepared to wear these pads for a while because normal urinary control may not be regained for 3-4 months from the time of your surgery.  Remember, everyone is different.  Some men regain control in a week, some take six months.  Don't be discouraged!      Remember to do your Kegel exercises regularly as soon as the catheter is removed.  The operation removed your prostate and affected your secondary urinary control mechanisms.  Your external sphincter muscle must now take over all responsibility for control.  It will take time and effort to strengthen this mechanism.    Some men may continue to have mild incontinence with straining even several years after surgery.  You can avoid a problem in these situations by wearing a small pad.  Rarely, urinary control will be  unsatisfactory even after a year.  If so, something can still be done.  Though rarely needed, there are techniques for restoring control such as pelvic floor therapy or surgical placement of an artificial urinary sphincter.      Regaining Sexual Function:    The operation will affect sexual function in several ways. There are four components to sexual function in men: sexual drive, sensation, erection and climax (orgasm).  Although these normally occur together, they actually are seperate functions.  Losing one does not necessarily mean you will lose the others.    Erections occur due to a complex sequence of events involving stimulation of the cavernosal nerves and engorgement of the penis with blood.  The cavernosal nerves run alongside the prostate, only millimeters away from where cancer often occurs.  Sometimes parts of these nerves can be preserved during surgery, but some are almost always negatively affected by the surgery too. Some of the blood supply that induces erections can also be negatively affected by surgery since some of these vessels run along the prostate as well.    There is a chance of recovering erections, but recovery may be slow.  Nerves can heal, but very slowly.  The average time to recovery for erections adequate for sexual intercourse is 6-18 months, but in some men can be even longer.  While you are waiting for erections to return, a number of approaches are available for achieving erections.  Ask about these in our office if you are interested.  If these methods are unsuccessful, a prothesis can be placed to restore sexual function.    Orgasm should not be affected by the surgery, but ejaculation (the release of fluid during orgasm) will no longer occur.  You will still have the same sensations of pleasure, but no fluid will be discharged, and you will have a dry ejaculation.  This is because the seminal vesicles, which store fluid for ejaculation, and the vas deferens, the tubes that  carry sperm to the prostate, are removed and cut during the operation.  This means that you will be infertile and no longer able to father children.    Other:    You may have bladder spasms while the catheter is in place. Bladder spasms are typically associated with a sudden onset of lower-abdominal discomfort, a strong urge to urinate, or with sudden leakage of urine from around the catheter.      Bloody drainage around the Bagley catheter or in the urine is possible. Under stress, such as during physical activity or bowel movement, this is not uncommon immediately after surgery. This should improve if you cease activity and rest for a short while and stay extra hydrated. If it does not, or if you see clots in your urine, or have no urine output for two hours, contact your physician.    You can have bruising around the port sites: This is not uncommon and should not worry you.  They will go away as you heal.    You may have lower legs/ankle swelling. This is not uncommon and is typically not cause for serious concern. The swelling should go away in a week or two. Elevating your legs while sitting will help. Let us know if you have lower leg sudden pain and swelling.    Perineal discomfort (pain between your rectum and scrotum) can occur. This may last for several weeks after surgery, but it should resolve on its own. If you are suffering significant pain despite pain medication, contact your physician.  You might also try elevating your feet on a small stool when you have a bowel movement, applying hemorrhoid ointment, and increasing the fiber and water intake in your diet.    Scrotal/penile Swelling and bruising can occur. This is normal and is not cause for serious concern. You might notice this anywhere from immediately after surgery to 5 days later. It should go away on its own in a week or two. You might try elevating your scrotum on a small rolled up towel when you are sitting or lying down to reduce swelling.  Also, wearing supportive underwear (briefs, not boxer shorts) is advisable.    You had a surgical drain in place that we removed in the hospital. The dressing we put over it might get wet or stain with blood for the next 2-3 days. You may change this dressing as needed. This incision should close and stop draining fluid in 2-3 days. Once this happens you can remove the dressing and leave it open to air.    Call the office at [phone] (after-hours you will be directed to the urologist on call) if you develop a fever > 101°F, chills, redness and drainage from your incisions, chest pain, difficulty breathing, difficulty urinating, significant abdominal pain, vomiting, or leg swelling or pain in the next few weeks.    Go to the emergency room or call the office at [phone] (after-hours you will be directed to the urologist on call) if your Bagley catheter stops draining and you feel your bladder filling up.      Pawel Gould MD  Staff Urologist  [facility]  Office: [phone]

## 2025-05-07 NOTE — ANESTHESIA PROCEDURE NOTES
Regional Block    Date/Time: 5/7/2025 7:37 AM    Performed by: Mario Carlos CRNA  Authorized by: Mario Carlos CRNA      General Information and Staff    Start Time:  5/7/2025 7:37 AM  End Time:  5/7/2025 7:42 AM  Anesthesiologist:  Teddy Williamson MD  CRNA:  Mario Carlos CRNA  Performed by:  Anesthesiologist  Patient Location:  OR    Block Placement: Post Induction  Site Identification: real time ultrasound guided and image stored and retrievable    Block site/laterality marked before start: site marked  Reason for Block: at surgeon's request and post-op pain management    Preanesthetic Checklist: 2 patient identifers, IV checked, risks and benefits discussed, monitors and equipment checked, pre-op evaluation, timeout performed, anesthesia consent, sterile technique used, no prohibitive neurological deficits and no local skin infection at insertion site      Procedure Details    Patient Position:  Supine  Prep: ChloraPrep    Monitoring:  Cardiac monitor, continuous pulse ox, blood pressure cuff and heart rate  Block Type:  TAP  Laterality:  Bilateral  Injection Technique:  Single-shot    Needle    Needle Type:  Short-bevel and echogenic  Needle Gauge:  21 G  Needle Length:  100 mm  Needle Localization:  Ultrasound guidance  Reason for Ultrasound Use: appropriate spread of the medication was noted in real time and no ultrasound evidence of intravascular and/or intraneural injection            Assessment    Injection Assessment:  Good spread noted, negative resistance, negative aspiration for heme, incremental injection and low pressure  Heart Rate Change: No    - Patient tolerated block procedure well without evidence of immediate block related complications.     Medications  5/7/2025 7:37 AM      Additional Comments    Medication:  Ropivacaine 0.25% 50 mL      25 ml injected each side.  Each side successful 1st attempt.

## 2025-05-08 LAB
ANION GAP SERPL CALC-SCNC: 9 MMOL/L (ref 0–18)
BASOPHILS # BLD AUTO: 0.01 X10(3) UL (ref 0–0.2)
BASOPHILS NFR BLD AUTO: 0.1 %
BUN BLD-MCNC: 15 MG/DL (ref 9–23)
CALCIUM BLD-MCNC: 8.7 MG/DL (ref 8.7–10.6)
CHLORIDE SERPL-SCNC: 106 MMOL/L (ref 98–112)
CO2 SERPL-SCNC: 24 MMOL/L (ref 21–32)
CREAT BLD-MCNC: 1.55 MG/DL (ref 0.7–1.3)
EGFRCR SERPLBLD CKD-EPI 2021: 49 ML/MIN/1.73M2 (ref 60–?)
EOSINOPHIL # BLD AUTO: 0.01 X10(3) UL (ref 0–0.7)
EOSINOPHIL NFR BLD AUTO: 0.1 %
ERYTHROCYTE [DISTWIDTH] IN BLOOD BY AUTOMATED COUNT: 14.2 %
GLUCOSE BLD-MCNC: 103 MG/DL (ref 70–99)
HCT VFR BLD AUTO: 39.7 % (ref 39–53)
HGB BLD-MCNC: 13.1 G/DL (ref 13–17.5)
IMM GRANULOCYTES # BLD AUTO: 0.04 X10(3) UL (ref 0–1)
IMM GRANULOCYTES NFR BLD: 0.5 %
LYMPHOCYTES # BLD AUTO: 1.81 X10(3) UL (ref 1–4)
LYMPHOCYTES NFR BLD AUTO: 22.1 %
MCH RBC QN AUTO: 27.1 PG (ref 26–34)
MCHC RBC AUTO-ENTMCNC: 33 G/DL (ref 31–37)
MCV RBC AUTO: 82.2 FL (ref 80–100)
MONOCYTES # BLD AUTO: 0.75 X10(3) UL (ref 0.1–1)
MONOCYTES NFR BLD AUTO: 9.1 %
NEUTROPHILS # BLD AUTO: 5.58 X10 (3) UL (ref 1.5–7.7)
NEUTROPHILS # BLD AUTO: 5.58 X10(3) UL (ref 1.5–7.7)
NEUTROPHILS NFR BLD AUTO: 68.1 %
OSMOLALITY SERPL CALC.SUM OF ELEC: 289 MOSM/KG (ref 275–295)
PLATELET # BLD AUTO: 182 10(3)UL (ref 150–450)
POTASSIUM SERPL-SCNC: 4.2 MMOL/L (ref 3.5–5.1)
RBC # BLD AUTO: 4.83 X10(6)UL (ref 3.8–5.8)
SODIUM SERPL-SCNC: 139 MMOL/L (ref 136–145)
WBC # BLD AUTO: 8.2 X10(3) UL (ref 4–11)

## 2025-05-08 NOTE — PROGRESS NOTES
[facility]   part of [facility]    Progress Note    Jaylon Montano Patient Status:  Outpatient in a Bed    3/13/1960 MRN [id number]   Location [facility] 3NW-A Attending Pawel Gould MD   Hosp Day # 0 PCP Ariel Trimble DO     Subjective:   Jaylon Montano is a(n) 65 year old male s/p Robot-assisted laparoscopic radical retropubic prostatectomy, bilateral pelvic lymphadenectomy 2025.    No overnight events. No CP/SOB. Has not had anything to eat - waiting for lunch to order. Has ambulated, no flatus. Pain controlled.    Objective:   Blood pressure 138/71, pulse 72, temperature 98.3 °F (36.8 °C), temperature source Oral, resp. rate 16, height 5' 10\" (1.778 m), weight 188 lb 3.2 oz (85.4 kg), SpO2 98%.    No distress  Non labored respirations  Abdomen softly distended, incisions c/d/I, ARGELIA with SS fluid  Wolfe draining clear yellow  No LE edema  Pulses 2+ bilaterally  Appropriate affect and mood    Results:   Lab Results   Component Value Date    WBC 8.2 2025    HGB 13.1 2025    HCT 39.7 2025    .0 2025    CREATSERUM 1.55 (H) 2025    BUN 15 2025     2025    K 4.2 2025     2025    CO2 24.0 2025     (H) 2025    CA 8.7 2025    ALB 4.5 2024    ALKPHO 72 2024    BILT 0.7 2024    TP 7.3 2024    AST 21 2024    ALT 24 2024    INR 0.95 2025    T4F 0.9 2021    TSH 0.92 2021    PSA 4.2 (H) 2024    CRP 0.34 2017       No results found.        Assessment & Plan:   Prostate cancer (HCC)  s/p Robot-assisted laparoscopic radical retropubic prostatectomy, bilateral pelvic lymphadenectomy 2025.  AF, HD stable  Post op labs reviewed  Good UOP  Pain controlled    PLAN : Encouraged ambulation and use of IS x 10/hr. Will monitor ARGELIA outpt today, anticipate removal prior to discharge. Continue wolfe catheter. Restrictions reviewed. Follow up as  scheduled.    Future Appointments   Date Time Provider Department Center   5/19/2025  1:00 PM Pawel Gould MD [id number]   7/14/2025  3:00 PM Pawel Gould MD [id number] [facility]       Milli Rizvi PA-C  Located within [facility]  5/8/2025

## 2025-05-08 NOTE — PROGRESS NOTES
Alert & oriented x4.With tolerable pain.No flatus but burping.Tolerated diet.Bagley catheter intact.Up in chair . Use of I.S. and ambulation in hallway encouraged.Plan of care discussed with patient.All questions and concerns addressed.

## 2025-05-08 NOTE — PROGRESS NOTES
A&Ox4. RA. VSS. 5 lap sites CDI and nadja drain intact- ss output. Pain managed per MAR. Denies nausea and SOB. Bagley in place. Tolerating CLD. Safety measures in place. All questions and concerns addressed. Call light within reach.

## 2025-05-08 NOTE — DISCHARGE SUMMARY
[facility]   part of [facility]    Discharge Summary    Jaylon Montano Patient Status:  Outpatient in a Bed    3/13/1960 MRN [id number]   Location [facility] 3NW-A Attending Pawel Gould MD   Hosp Day # 0 PCP Ariel Trimble DO     Date of Admission: 2025 Disposition: Final discharge disposition not confirmed     Date of Discharge: 2025    Admitting Diagnosis: Prostate cancer (HCC) [C61]  Prostate cancer (HCC)    Discharge Diagnosis: Problem List[1]    Reason for Admission: prostate cancer    Physical Exam:   GENERAL APPEARANCE: a well-developed, well-nourished, in no apparent distress. A&O x 3  ABDOMEN: soft, good BS's, no masses/HSM, no tenderness  CVA: no CVA tenderness      History of Present Illness:  65 year old male who presented with elevated PSA and was found to have unfavorable intermediate risk prostate cancer on biopsy. The patient was counseled on options, risks, and benefits (mainly active surveillance, radiation, or surgery) and elected to undergo the above procedure. We discussed risks including, but not limited to, bleeding, infection, damage to surrounding structures, need for repeat procedure(s), erectile dysfunction, urinary incontinence, conversion to open procedure, and need to abort the procedure. The patient understood these risks and wished to proceed with surgery.     Hospital Course: After informed consent was obtained for Robot-assisted laparoscopic radical retropubic prostatectomy, bilateral pelvic lymphadenectomy, the patient was brought to the OR where aforementioned procedure was completed without any intraoperative complication. Patient was transferred from PACU to the floor without event. Patient progressed as expected on the floor, tolerating advancement of diet with appropriate return of bowel function. Pain is well controlled on oral pain medications and patient is ambulatory. Patient will follow up as scheduled for wolfe removal. Restrictions and  post op instructions were reviewed.     Consultations: none    Procedures: Robot-assisted laparoscopic radical retropubic prostatectomy, bilateral pelvic lymphadenectomy    Complications: none    Discharge Condition: Good         Discharge Medications:      Discharge Medications        START taking these medications        Instructions Prescription details   HYDROcodone-acetaminophen 5-325 MG Tabs  Commonly known as: Norco  Notes to patient: *Don't take with Tylenol or Tylenol containing products  *Don't exceed 4000 mg of Acetaminophen in 24 hrs  *Don't drive or drink alcohol if taking Norco  *Take with food      Take 1 tablet by mouth every 4 (four) hours as needed for Pain.   Quantity: 10 tablet  Refills: 0     Polyethylene Glycol 3350 17 g Pack  Commonly known as: MiraLax      Take 17 g by mouth daily as needed (Take to avoid constipation, especially if taking narcotic pain medications.).   Quantity: 20 packet  Refills: 1            STOP taking these medications      Sildenafil Citrate 100 MG Tabs  Commonly known as: VIAGRA                  Where to Get Your Medications        These medications were sent to [place] [address] [phone], [phone]  [address] [place] [address]      Phone: [phone]   HYDROcodone-acetaminophen 5-325 MG Tabs  Polyethylene Glycol 3350 17 g Pack         Other Discharge Instructions:   You had a laparoscopic/robotic prostate surgery in the operating room.    Instructions:    Follow-Up:    Your follow-up appointment is listed below. Please contact us at [phone] if you need to change your appointment.    Your appointments       Date & Time Appointment Department ([place])    May 19, 2025 1:00 PM CDT Post Op Visit with Pawel Gould MD [facility] ([place] [address])        Jul 14, 2025 3:00 PM CDT Follow Up Visit with Pawel Gould MD [facility],  [facility] ([place] [address])              [facility], [facility] 4  [address]  [facility] [address] [phone]           Activity:    No heavy lifting > 15 lbs or strenuous activity for 6 weeks, to avoid risk for hernia. This includes activities such as golf, tennis, mowing the lawn, lifting weights, and running.    No bike riding (including stationary bikes) for 6 weeks.    You should walk around often after surgery (at least 6 times per day). Even if you are sore, you should avoid laying down all day as this can put you at risk for complications including blood clots, pneumonia, and constipation.     You may shower starting 2 days after surgery. Let the soapy water run over the incisions and do not scrub them. You have dissolvable stitches under the skin and surgical skin glue on the incisions. The glue will flake off within 1-2 weeks. You should not soak, bathe, or swim for 10-14 days.    Do not drive until you are off narcotic pain medications and your pain is gone. This typically takes 1-2 weeks. Avoid long car rides over 3-4 hours for the next month, as this can increase your blood clot risk.    Diet:    Eat light meals for the first few days after surgery, and focus on good fluid intake instead to avoid dehydration.     Try to drink at least 6-8 glasses of water per day. Avoid soda or carbonated beverages for the first few days after surgery.    Medications:    You may experience pain after the procedure for a few days.  If pain becomes intolerable please contact our office or go to the nearest Emergency Room/Immediate Care. You make take over-the counter Tylenol/Acetaminophen for mild pain.  For pain not controlled by Tylenol, you may take the prescribed narcotic pain medication oxycodone. Try to stop any narcotic pain medications as soon as possible after surgery when your pain improves, as they can be addictive and cause  constipation.    Do not take more than 4000 mg of Tylenol per day. If you have any liver problems, talk with your primary care doctor about your maximum Tylenol limit, as it may be less than 4000 mg/day.    You may take Ibuprofen/Motrin/Advil 200-400 mg every 8 hours as needed for pain with plenty of fluids. You may alternate this with Tylenol, and take this in addition to other prescribed pain medications.    Take the prescribed MiraLAX to soften your stool.  Once you are off narcotic pain medication and having daily, soft bowel movements you can stop this medication.    If you take blood thinners (such as aspirin, plavix, coumadin, xarelto, etc) please DO NOT take them when you go home. You may resume these medications in 7 days.    Bagley Catheter Care:    You will be discharged from the hospital with a Bagley catheter in place which continuously drains urine from your bladder.  It must stay in place while your anastomosis heals.  Do not attempt to remove this on your own.  If it should accidentally fall out, you MUST IMMEDIATELY notify your urologist to have it replaced.  Do NOT allow a non-urologist (even if they are a nurse or a doctor) to replace it without speaking to someone from our office first.    You may use a small amount of Vaseline or antibiotic ointment to lubricate the outside catheter where it enters the tip of your penis (the urethral meatus) if this area becomes dry or irritated.    You will be provided with a \"stat-lock,\" a plastic clip which will be glued to your thigh to hold the catheter. This will be removed when your catheter is removed.    You will be provided with two urine collection bags of different sizes. Please use the larger bag while at home and sleeping. You may use the smaller leg bag while ambulating outside of the house. The leg bag usually lasts about 3-4 hours before needing to be emptied, but of course this varies with how much liquid you consume.  The larger bag should last  you all night, so you do not need to wake up to empty it.  Remove, empty, and exchange these two bags as needed.    Please keep Bagley bag below the level of your bladder to allow urine to drain properly and to prevent urinary tract infection.    Alert the surgeon if the catheter does not drain well, or if you have any other serious problems with it.    Regaining Urinary Control:    Most men have difficulty with urinary control after catheter removal and leak urine with coughing/sneezing/lifting.  You should bring an adult urinary pad (such as Depend Guards) with you the day your catheter is removed.  You should be prepared to wear these pads for a while because normal urinary control may not be regained for 3-4 months from the time of your surgery.  Remember, everyone is different.  Some men regain control in a week, some take six months.  Don't be discouraged!      Remember to do your Kegel exercises regularly as soon as the catheter is removed.  The operation removed your prostate and affected your secondary urinary control mechanisms.  Your external sphincter muscle must now take over all responsibility for control.  It will take time and effort to strengthen this mechanism.    Some men may continue to have mild incontinence with straining even several years after surgery.  You can avoid a problem in these situations by wearing a small pad.  Rarely, urinary control will be unsatisfactory even after a year.  If so, something can still be done.  Though rarely needed, there are techniques for restoring control such as pelvic floor therapy or surgical placement of an artificial urinary sphincter.      Regaining Sexual Function:    The operation will affect sexual function in several ways. There are four components to sexual function in men: sexual drive, sensation, erection and climax (orgasm).  Although these normally occur together, they actually are seperate functions.  Losing one does not necessarily mean you  will lose the others.    Erections occur due to a complex sequence of events involving stimulation of the cavernosal nerves and engorgement of the penis with blood.  The cavernosal nerves run alongside the prostate, only millimeters away from where cancer often occurs.  Sometimes parts of these nerves can be preserved during surgery, but some are almost always negatively affected by the surgery too. Some of the blood supply that induces erections can also be negatively affected by surgery since some of these vessels run along the prostate as well.    There is a chance of recovering erections, but recovery may be slow.  Nerves can heal, but very slowly.  The average time to recovery for erections adequate for sexual intercourse is 6-18 months, but in some men can be even longer.  While you are waiting for erections to return, a number of approaches are available for achieving erections.  Ask about these in our office if you are interested.  If these methods are unsuccessful, a prothesis can be placed to restore sexual function.    Orgasm should not be affected by the surgery, but ejaculation (the release of fluid during orgasm) will no longer occur.  You will still have the same sensations of pleasure, but no fluid will be discharged, and you will have a dry ejaculation.  This is because the seminal vesicles, which store fluid for ejaculation, and the vas deferens, the tubes that carry sperm to the prostate, are removed and cut during the operation.  This means that you will be infertile and no longer able to father children.    Other:    You may have bladder spasms while the catheter is in place. Bladder spasms are typically associated with a sudden onset of lower-abdominal discomfort, a strong urge to urinate, or with sudden leakage of urine from around the catheter.      Bloody drainage around the Bagley catheter or in the urine is possible. Under stress, such as during physical activity or bowel movement, this is  not uncommon immediately after surgery. This should improve if you cease activity and rest for a short while and stay extra hydrated. If it does not, or if you see clots in your urine, or have no urine output for two hours, contact your physician.    You can have bruising around the port sites: This is not uncommon and should not worry you.  They will go away as you heal.    You may have lower legs/ankle swelling. This is not uncommon and is typically not cause for serious concern. The swelling should go away in a week or two. Elevating your legs while sitting will help. Let us know if you have lower leg sudden pain and swelling.    Perineal discomfort (pain between your rectum and scrotum) can occur. This may last for several weeks after surgery, but it should resolve on its own. If you are suffering significant pain despite pain medication, contact your physician.  You might also try elevating your feet on a small stool when you have a bowel movement, applying hemorrhoid ointment, and increasing the fiber and water intake in your diet.    Scrotal/penile Swelling and bruising can occur. This is normal and is not cause for serious concern. You might notice this anywhere from immediately after surgery to 5 days later. It should go away on its own in a week or two. You might try elevating your scrotum on a small rolled up towel when you are sitting or lying down to reduce swelling. Also, wearing supportive underwear (briefs, not boxer shorts) is advisable.    You had a surgical drain in place that we removed in the hospital. The dressing we put over it might get wet or stain with blood for the next 2-3 days. You may change this dressing as needed. This incision should close and stop draining fluid in 2-3 days. Once this happens you can remove the dressing and leave it open to air.    Call the office at [phone] (after-hours you will be directed to the urologist on call) if you develop a fever > 101°F, chills,  redness and drainage from your incisions, chest pain, difficulty breathing, difficulty urinating, significant abdominal pain, vomiting, or leg swelling or pain in the next few weeks.    Go to the emergency room or call the office at [phone] (after-hours you will be directed to the urologist on call) if your Bagley catheter stops draining and you feel your bladder filling up.      Milli Rizvi PA-C  [facility]           [1]   Patient Active Problem List  Diagnosis    Melasma    Erectile dysfunction    Esophageal dysphagia    Benign prostatic hyperplasia with urinary frequency    Pure hypercholesterolemia    Median nerve dysfunction, left    Benign essential tremor    Elevated PSA    Prostate cancer (HCC)

## 2025-05-09 VITALS
SYSTOLIC BLOOD PRESSURE: 149 MMHG | WEIGHT: 188.19 LBS | RESPIRATION RATE: 17 BRPM | HEIGHT: 70 IN | HEART RATE: 79 BPM | TEMPERATURE: 98 F | OXYGEN SATURATION: 98 % | DIASTOLIC BLOOD PRESSURE: 91 MMHG | BODY MASS INDEX: 26.94 KG/M2

## 2025-05-09 LAB
ANION GAP SERPL CALC-SCNC: 7 MMOL/L (ref 0–18)
BASOPHILS # BLD AUTO: 0.02 X10(3) UL (ref 0–0.2)
BASOPHILS NFR BLD AUTO: 0.3 %
BUN BLD-MCNC: 14 MG/DL (ref 9–23)
CALCIUM BLD-MCNC: 9 MG/DL (ref 8.7–10.6)
CHLORIDE SERPL-SCNC: 104 MMOL/L (ref 98–112)
CO2 SERPL-SCNC: 26 MMOL/L (ref 21–32)
CREAT BLD-MCNC: 1.41 MG/DL (ref 0.7–1.3)
EGFRCR SERPLBLD CKD-EPI 2021: 55 ML/MIN/1.73M2 (ref 60–?)
EOSINOPHIL # BLD AUTO: 0.07 X10(3) UL (ref 0–0.7)
EOSINOPHIL NFR BLD AUTO: 1 %
ERYTHROCYTE [DISTWIDTH] IN BLOOD BY AUTOMATED COUNT: 14.1 %
GLUCOSE BLD-MCNC: 97 MG/DL (ref 70–99)
HCT VFR BLD AUTO: 41 % (ref 39–53)
HGB BLD-MCNC: 13.3 G/DL (ref 13–17.5)
IMM GRANULOCYTES # BLD AUTO: 0.03 X10(3) UL (ref 0–1)
IMM GRANULOCYTES NFR BLD: 0.4 %
LYMPHOCYTES # BLD AUTO: 1.89 X10(3) UL (ref 1–4)
LYMPHOCYTES NFR BLD AUTO: 26.1 %
MCH RBC QN AUTO: 26.7 PG (ref 26–34)
MCHC RBC AUTO-ENTMCNC: 32.4 G/DL (ref 31–37)
MCV RBC AUTO: 82.2 FL (ref 80–100)
MONOCYTES # BLD AUTO: 0.61 X10(3) UL (ref 0.1–1)
MONOCYTES NFR BLD AUTO: 8.4 %
NEUTROPHILS # BLD AUTO: 4.63 X10 (3) UL (ref 1.5–7.7)
NEUTROPHILS # BLD AUTO: 4.63 X10(3) UL (ref 1.5–7.7)
NEUTROPHILS NFR BLD AUTO: 63.8 %
OSMOLALITY SERPL CALC.SUM OF ELEC: 284 MOSM/KG (ref 275–295)
PLATELET # BLD AUTO: 186 10(3)UL (ref 150–450)
POTASSIUM SERPL-SCNC: 4 MMOL/L (ref 3.5–5.1)
RBC # BLD AUTO: 4.99 X10(6)UL (ref 3.8–5.8)
SODIUM SERPL-SCNC: 137 MMOL/L (ref 136–145)
WBC # BLD AUTO: 7.3 X10(3) UL (ref 4–11)

## 2025-05-09 NOTE — PLAN OF CARE
Pt vitals stable, A&O x4. Denied chest pain and shortness of breath. Stated having gas pain, educated on how to help get rid of gas pain and scheduled meds given. Pt stated having a BM, but not passing gas. Feeling bloated. To remain on full liquids for now. Denies nausea. Laps x5 and nadja noted to abdomen. Bed locked in low position, call light in reach, rounding provided.     0530: pt stated passing gas and denied abdominal pain. States he is feeling much better. Diet advanced.

## 2025-05-09 NOTE — PROGRESS NOTES
Felt bloated after few spoons of cream of chicken and more abdominal pain .No flatus yet .Ambulation in  hallway encouraged.Discharge  held by Priya SANTOS.

## 2025-05-12 ENCOUNTER — TELEPHONE (OUTPATIENT)
Dept: SURGERY | Facility: CLINIC | Age: 65
End: 2025-05-12

## 2025-05-12 NOTE — TELEPHONE ENCOUNTER
This encounter is now closed.     RN called patient. He is doing a follow up on his STD. Said that his employer said that they fax information already. RN received a note from Oniel Rios ( Disability Dept) with a note that \" Please Provide the following information to assist in our disability claim review: Completed Attending Physician Statement.    Claim Number: [id number]    Referred him to Forms Dept and copy handed to CHUN Simon. Patient agreed to follow up with .     [facility] [employer] Department   Phone: [phone]  Fax: [phone]

## 2025-05-12 NOTE — TELEPHONE ENCOUNTER
Per patient the forms department faxed patient's short term disability forms to be signed. Please advise

## 2025-05-14 ENCOUNTER — TELEPHONE (OUTPATIENT)
Dept: SURGERY | Facility: CLINIC | Age: 65
End: 2025-05-14

## 2025-05-16 ENCOUNTER — TELEPHONE (OUTPATIENT)
Dept: SURGERY | Facility: CLINIC | Age: 65
End: 2025-05-16

## 2025-05-16 NOTE — TELEPHONE ENCOUNTER
Per patient calling stating there is blood in his catheter. Per patient is not reporting any pain or discomfort, but is concerned as to wether this is normal or not. Please call back.

## 2025-05-19 ENCOUNTER — OFFICE VISIT (OUTPATIENT)
Dept: SURGERY | Facility: CLINIC | Age: 65
End: 2025-05-19
Payer: COMMERCIAL

## 2025-05-19 ENCOUNTER — TELEPHONE (OUTPATIENT)
Dept: SURGERY | Facility: CLINIC | Age: 65
End: 2025-05-19

## 2025-05-19 DIAGNOSIS — C61 PROSTATE CANCER (HCC): Primary | ICD-10-CM

## 2025-05-19 PROCEDURE — 99024 POSTOP FOLLOW-UP VISIT: CPT | Performed by: SURGERY

## 2025-05-19 RX ORDER — TADALAFIL 5 MG/1
5 TABLET ORAL
Qty: 30 TABLET | Refills: 11 | Status: SHIPPED | OUTPATIENT
Start: 2025-05-19

## 2025-05-19 NOTE — PROGRESS NOTES
Urology Clinic Note    Primary Care Provider:  Ariel Trimble DO     Chief Complaint:   Prostate cancer     HPI & Assessment:   Jaylon Montano is a 65 year old male with history of DM2 and BPH/LUTS.     Last PSA 2.92 on 9/26/23. Prior to that it was 2.01.      Repeat PSA on 12/16/24 was 3.13. Plan was for 6 month repeat.     Good urinary stream. Occasional urinary frequency but high fluid intake. Nocturia 1-2 times per night. He does cut off fluids before bed time by 2-3 hours.    Repeat PSA 7/19/2024 was 4.2.     Prostate MRI on 12/13/2024 showed volume 26 cc, 1.6 cm PI-RADS 4 lesion with broad capsular contact in the left PZ mid gland/apex.     I performed MRI fusion prostate biopsy on 1/28/2025.  Pathology showed Ruddy 4+3 prostate cancer in the right mid gland and region of interest, [place] 3+4 prostate cancer in the left base and right base, Ruddy 3+3 in the right apex and left mid.     PSMA PET scan 2/19/2025 showed indeterminant pulmonary nodules up to 6 mm (not PET avid), no signs of metastatic disease.     I brought him to the OR on 5/7/25 for robotic radical prostatectomy, bilateral pelvic lymphadenectomy. Pathology still pending.    Feeling well today.    Plan:   -Bagley removed today  -No heavy lifting > 15 lbs for 6 weeks after surgery  -Stool softeners as needed to avoid constipation  -Start Cialis 5 mg daily 4 weeks after surgery  -Repeat PSA 6 weeks after surgery  -Encouraged regular Kegel exercises to help slowly regain continence  -Return to clinic in 3 months  - Follow-up pathology       PSA:  Lab Results   Component Value Date    PSA 4.2 (H) 07/19/2024    PSA 1.52 01/22/2019    PSA 0.889 09/05/2013    PSAS 1.72 12/20/2017    PSAS 1.40 08/09/2016    QPSA 3.13 12/16/2023    QPSA 2.92 09/26/2023    QPSA 2.01 08/26/2022    QPSA 2.11 10/20/2021    QPSA 1.7 10/31/2020    QPSA 1.9 10/14/2019        History:   Past Medical History[1]    Past Surgical History[2]    Family History[3]    Short  Social Hx on File[4]    Medications (Active prior to today's visit):  Current Medications[5]    Allergies:  Allergies[6]    Review of Systems:   A comprehensive 10-point review of systems was completed.  Pertinent positives and negatives are noted in the the HPI.    Physical Exam:   CONSTITUTIONAL: Well developed, well nourished, in no acute distress  NEUROLOGIC: Alert and oriented  HEAD: Normocephalic, atraumatic  EYES: Sclera non-icteric  ENT: Hearing intact, moist mucous membranes  NECK: No obvious goiter or masses  RESPIRATORY: Normal respiratory effort  SKIN: No evident rashes  ABDOMEN: Soft, non-tender, non-distended, well healed lap incisions      In total, 20 minutes were spent on this patient encounter (including chart review, patient history, physical, counseling, documentation, and communication).    Pawel Gould MD  Staff Urologist  [facility]  Office: [phone]             [1]   Past Medical History:   Decorative tattoo    Prediabetes    Prostate cancer (HCC)    Type 2 diabetes mellitus without complication, without long-term current use of insulin (HCC)    Wears glasses   [2]   Past Surgical History:  Procedure Laterality Date    Colonoscopy  10/17/2018    [facility] biopsy prostate (cpt=[id number])     [3]   Family History  Problem Relation Age of Onset    Heart Disorder Father     Hypertension Father     Hypertension Mother     Hypertension Brother     Colon Polyps Brother    [4]   Social History  Socioeconomic History    Marital status:     Number of children: 5   Tobacco Use    Smoking status: Never    Smokeless tobacco: Never   Vaping Use    Vaping status: Never Used   Substance and Sexual Activity    Alcohol use: Yes     Alcohol/week: 3.0 standard drinks of alcohol     Types: 3 Cans of beer per week     Comment: beer once a month    Drug use: No    Sexual activity: Yes     Partners: Female   Social History Narrative    , lives with wife    Has 5 daughters, 3  in [place]     Social Drivers of Health     Food Insecurity: No Food Insecurity (5/7/2025)    NCSS - Food Insecurity     Worried About Running Out of Food in the Last Year: No     Ran Out of Food in the Last Year: No   Transportation Needs: No Transportation Needs (5/7/2025)    NCSS - Transportation     Lack of Transportation: No   Housing Stability: Not At Risk (5/7/2025)    NCSS - Housing/Utilities     Has Housing: Yes     Worried About Losing Housing: No     Unable to Get Utilities: No   [5]   Current Outpatient Medications   Medication Sig Dispense Refill    Polyethylene Glycol 3350 (MIRALAX) 17 g Oral Powd Pack Take 17 g by mouth daily as needed (Take to avoid constipation, especially if taking narcotic pain medications.). 20 packet 1    HYDROcodone-acetaminophen 5-325 MG Oral Tab Take 1 tablet by mouth every 4 (four) hours as needed for Pain. 10 tablet 0   [6]   Allergies  Allergen Reactions    Ace Inhibitors Coughing    Metformin DIARRHEA

## 2025-05-19 NOTE — TELEPHONE ENCOUNTER
Pt was seen today for follow up and wolfe removal.   Pt states he forgot to let MD know that he gets bad acid reflux with Cialis.     : Pt would like a new medication to try due to SE of Cialis.

## 2025-05-20 NOTE — PROGRESS NOTES
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  Negative lymph nodes.  Less than 3 mm focal positive margins.    Called patient to discuss.  PSA 6-week postop.    Future Appointments  7/14/2025  3:00 PM    Pawel Gould MD           [id number]           [facility]

## 2025-05-30 ENCOUNTER — TELEPHONE (OUTPATIENT)
Dept: FAMILY MEDICINE CLINIC | Facility: CLINIC | Age: 65
End: 2025-05-30

## 2025-05-30 ENCOUNTER — LAB ENCOUNTER (OUTPATIENT)
Dept: LAB | Age: 65
End: 2025-05-30
Attending: FAMILY MEDICINE
Payer: COMMERCIAL

## 2025-05-30 ENCOUNTER — OFFICE VISIT (OUTPATIENT)
Dept: FAMILY MEDICINE CLINIC | Facility: CLINIC | Age: 65
End: 2025-05-30
Payer: COMMERCIAL

## 2025-05-30 VITALS
OXYGEN SATURATION: 96 % | RESPIRATION RATE: 17 BRPM | WEIGHT: 185 LBS | BODY MASS INDEX: 26.48 KG/M2 | DIASTOLIC BLOOD PRESSURE: 72 MMHG | HEIGHT: 70 IN | SYSTOLIC BLOOD PRESSURE: 130 MMHG | HEART RATE: 99 BPM

## 2025-05-30 DIAGNOSIS — K21.00 GASTROESOPHAGEAL REFLUX DISEASE WITH ESOPHAGITIS WITHOUT HEMORRHAGE: Primary | ICD-10-CM

## 2025-05-30 DIAGNOSIS — N18.31 STAGE 3A CHRONIC KIDNEY DISEASE (HCC): ICD-10-CM

## 2025-05-30 DIAGNOSIS — R73.03 PREDIABETES: ICD-10-CM

## 2025-05-30 DIAGNOSIS — E78.5 HYPERLIPIDEMIA, UNSPECIFIED HYPERLIPIDEMIA TYPE: ICD-10-CM

## 2025-05-30 DIAGNOSIS — K21.00 GASTROESOPHAGEAL REFLUX DISEASE WITH ESOPHAGITIS WITHOUT HEMORRHAGE: ICD-10-CM

## 2025-05-30 DIAGNOSIS — R20.0 NUMBNESS OF FINGER: ICD-10-CM

## 2025-05-30 LAB
CHOLEST SERPL-MCNC: 210 MG/DL (ref ?–200)
EST. AVERAGE GLUCOSE BLD GHB EST-MCNC: 126 MG/DL (ref 68–126)
FASTING PATIENT LIPID ANSWER: YES
HBA1C MFR BLD: 6 % (ref ?–5.7)
HDLC SERPL-MCNC: 45 MG/DL (ref 40–59)
LDLC SERPL CALC-MCNC: 134 MG/DL (ref ?–100)
NONHDLC SERPL-MCNC: 165 MG/DL (ref ?–130)
TRIGL SERPL-MCNC: 176 MG/DL (ref 30–149)
VLDLC SERPL CALC-MCNC: 32 MG/DL (ref 0–30)

## 2025-05-30 PROCEDURE — 99214 OFFICE O/P EST MOD 30 MIN: CPT | Performed by: FAMILY MEDICINE

## 2025-05-30 PROCEDURE — 3008F BODY MASS INDEX DOCD: CPT | Performed by: FAMILY MEDICINE

## 2025-05-30 PROCEDURE — 80061 LIPID PANEL: CPT | Performed by: FAMILY MEDICINE

## 2025-05-30 PROCEDURE — 3078F DIAST BP <80 MM HG: CPT | Performed by: FAMILY MEDICINE

## 2025-05-30 PROCEDURE — 83036 HEMOGLOBIN GLYCOSYLATED A1C: CPT | Performed by: FAMILY MEDICINE

## 2025-05-30 PROCEDURE — 3075F SYST BP GE 130 - 139MM HG: CPT | Performed by: FAMILY MEDICINE

## 2025-05-30 RX ORDER — OMEPRAZOLE 20 MG/1
20 CAPSULE, DELAYED RELEASE ORAL DAILY PRN
Qty: 30 CAPSULE | Refills: 1 | Status: SHIPPED | OUTPATIENT
Start: 2025-05-30 | End: 2025-06-02

## 2025-05-30 NOTE — TELEPHONE ENCOUNTER
, please review.Omeprazole prescription.I believe the \"daily as needed\" is in question.  Thanks.

## 2025-05-30 NOTE — PROGRESS NOTES
Subjective:   Patient ID: Jaylon Montano is a 65 year old male.    Diagnosed with prostate cancer in biopsy.  Robot-assisted laparoscopic radical retropubic prostatectomy, bilateral pelvic lymphadenectomy procedure done.     Complains of heartburn, acid reflux, twice in a month.    Complains of tingling in 2nd left finger, from 6 months, worse when he grab something with his left hand.     Pre primary hypertension, after surgery.    + Prediabetic, Last A1c value was 5.9% done 11/9/2024.      History/Other:   Review of Systems   All other systems reviewed and are negative.    Current Medications[1]  Allergies:Allergies[2]    Objective:   Physical Exam  Constitutional:       Appearance: He is well-developed.   Cardiovascular:      Rate and Rhythm: Normal rate and regular rhythm.      Heart sounds: Normal heart sounds.   Pulmonary:      Effort: Pulmonary effort is normal.      Breath sounds: Normal breath sounds.   Abdominal:      General: Bowel sounds are normal.      Palpations: Abdomen is soft.   Musculoskeletal:      Comments: Negative carpal tunnel test, negative cuboidal tunnel test, negative spurling's test   Skin:     General: Skin is warm and dry.   Neurological:      Mental Status: He is alert and oriented to person, place, and time.      Deep Tendon Reflexes: Reflexes are normal and symmetric.         Assessment & Plan:   - Avoid too salty food, avoid caffeine, no alcohol/smoking, do cardio for pre primary hypertension.  -Avoid motrin/ibuprofen, can take tylenol if needed, stay hydrated, for chronic kidney disease.  -Avoid using phone with flexing position of neck, maintain good posture, support elbows while working on the laptops, do stretching, avoid repetitive movements of elbow, for tingling in finger.  1. Gastroesophageal reflux disease with esophagitis without hemorrhage    2. Hyperlipidemia, unspecified hyperlipidemia type    3. Prediabetes    4. Numbness of finger    5. Stage 3a chronic kidney  disease (HCC)      Follow up in 6 months for physical.    1. Gastroesophageal reflux disease with esophagitis without hemorrhage  - omeprazole 20 MG Oral Capsule Delayed Release; Take 1 capsule (20 mg total) by mouth daily as needed. 30 mins before meal. Twice daily x 2 weeks, then once daily x 2 week.  Dispense: 30 capsule; Refill: 1    2. Hyperlipidemia, unspecified hyperlipidemia type  - Lipid Panel [E]    3. Prediabetes  - Hemoglobin A1C    4. Numbness of finger  -Chronic, stable. CPM    5. Stage 3a chronic kidney disease (HCC)  -Chronic, stable. CPM    Orders Placed This Encounter   Procedures    Lipid Panel [E]    Hemoglobin A1C       Meds This Visit:  Requested Prescriptions      No prescriptions requested or ordered in this encounter       Imaging & Referrals:  None         [1]   Current Outpatient Medications   Medication Sig Dispense Refill    tadalafil (CIALIS) 5 MG Oral Tab Take 1 tablet (5 mg total) by mouth daily as needed for Erectile Dysfunction. 30 tablet 11    omeprazole 20 MG Oral Capsule Delayed Release Once daily prn 30 capsule 1    Polyethylene Glycol 3350 (MIRALAX) 17 g Oral Powd Pack Take 17 g by mouth daily as needed (Take to avoid constipation, especially if taking narcotic pain medications.). (Patient not taking: Reported on 5/30/2025) 20 packet 1    HYDROcodone-acetaminophen 5-325 MG Oral Tab Take 1 tablet by mouth every 4 (four) hours as needed for Pain. 10 tablet 0   [2]   Allergies  Allergen Reactions    Ace Inhibitors Coughing    Metformin DIARRHEA

## 2025-06-02 ENCOUNTER — TELEPHONE (OUTPATIENT)
Dept: SURGERY | Facility: CLINIC | Age: 65
End: 2025-06-02

## 2025-06-02 RX ORDER — OMEPRAZOLE 20 MG/1
CAPSULE, DELAYED RELEASE ORAL
Qty: 30 CAPSULE | Refills: 1 | Status: SHIPPED | OUTPATIENT
Start: 2025-06-02

## 2025-06-02 NOTE — TELEPHONE ENCOUNTER
Ask him if he was taking BID. If he was taking BID then taper to once daily x 2 weeks, then just as needed.

## 2025-06-02 NOTE — TELEPHONE ENCOUNTER
Once daily pended, please approve and confirm you don't want him to taper from BID to every day.

## 2025-06-02 NOTE — TELEPHONE ENCOUNTER
This encounter is now closed.     RN called patient.  confirmed receiving the forms.   Reminded patient that FMLA forms are being forwarded to that Dept. He agreed to plans and follow up with them for inquiries. He agreed to plans.

## 2025-06-03 ENCOUNTER — TELEPHONE (OUTPATIENT)
Dept: SURGERY | Facility: CLINIC | Age: 65
End: 2025-06-03

## 2025-06-03 NOTE — TELEPHONE ENCOUNTER
Deo Escobar patient called to check on flma forms and states they have not received them yet. Patient dropped them off at th  6//2.    Email: [email or web address]  Fax: [phone]

## 2025-06-03 NOTE — TELEPHONE ENCOUNTER
This encounter is now closed.     RN called Shawn back from Forms Dept.   Forms were dropped off 6/2 AM, joyce picked it up at PM.  Spoke to the staff and updated

## 2025-06-04 ENCOUNTER — TELEPHONE (OUTPATIENT)
Dept: FAMILY MEDICINE CLINIC | Facility: CLINIC | Age: 65
End: 2025-06-04

## 2025-06-04 NOTE — TELEPHONE ENCOUNTER
Patient called back to give details. Patient is stating his forms are due by 6/14 and asked if we could expedite if possible.    Type of Leave: Continuous   Reason for Leave: SX  Start date of leave: 5/7/25  End date of leave: 6/15/25 Return to work 6/16/25  How many flare ups per month/length?:  How many appts per month/length?:   Was Fee and Turnaround info Given?:

## 2025-06-04 NOTE — TELEPHONE ENCOUNTER
Spoke with [facility] pharmacist  Omeprazole will be dispensed today.  Left detailed message for patient.

## 2025-06-05 ENCOUNTER — TELEPHONE (OUTPATIENT)
Dept: SURGERY | Facility: CLINIC | Age: 65
End: 2025-06-05

## 2025-06-05 NOTE — TELEPHONE ENCOUNTER
Family Medical Leave Act form completed and sent via [facility], form was not faxed per Release of Information request.

## 2025-06-05 NOTE — TELEPHONE ENCOUNTER
Forms revised and Disab forms faxed to [employer] pending confirmation-     FMLA scanned in patient chart no authorization on file

## 2025-06-05 NOTE — TELEPHONE ENCOUNTER
Dr Gould,    Patient is requesting for an ext on continuous leave/ disability.    Patient is requesting for leave to be ext pending re eval 07/14/25. Do you support?    Thanks,    Roz

## 2025-06-05 NOTE — TELEPHONE ENCOUNTER
Patient called to inform forms dept he is completing another questionnaire for Release of Information to send his completed forms to his employer.

## 2025-06-05 NOTE — TELEPHONE ENCOUNTER
Good morning ,     Please sign off on form if you agree to:   Family Medical Leave Act due to prostate cancer sx, start date 05/07/25-06/15/25  -Signature page will be the first page scanned  -From your Inbasket, Highlight the patient and click Chart   -Double click the 06/03/25 Forms Completion telephone encounter  -Scroll down to the Media section   -Click the blue Hyperlink: Dr.Biebel Pratik, 06/05/25  -Click Acknowledge located in the top right ribbon/menu   -Drag the mouse into the blank space of the document and a + sign will appear. Left click to   electronically sign the document.  -Once signed, simply exit out of the screen and you signature will be saved.     Thank you,  Conner  Forms Dept.

## 2025-06-06 NOTE — TELEPHONE ENCOUNTER
Patient called to confirm extension, informed patient forms were revised to extend to 7/14/25.  Patient acknowledged.

## 2025-06-06 NOTE — TELEPHONE ENCOUNTER
Patient called stating [employer] requesting LOV.  Informed patient that I would fax 5/19/25 OVN to [employer] and inform patient upon receipt of fax confirmation.  Patient acknowledged.

## 2025-07-07 ENCOUNTER — LAB ENCOUNTER (OUTPATIENT)
Dept: LAB | Facility: HOSPITAL | Age: 65
End: 2025-07-07
Attending: SURGERY
Payer: COMMERCIAL

## 2025-07-07 DIAGNOSIS — C61 PROSTATE CANCER (HCC): ICD-10-CM

## 2025-07-07 PROCEDURE — 84153 ASSAY OF PSA TOTAL: CPT

## 2025-07-07 PROCEDURE — 36415 COLL VENOUS BLD VENIPUNCTURE: CPT

## 2025-07-08 LAB
PSA ULTRASENSITIVE: <0.006 NG/ML
PSA ULTRASENSITIVE: <0.006 NG/ML

## 2025-07-14 ENCOUNTER — OFFICE VISIT (OUTPATIENT)
Dept: SURGERY | Facility: CLINIC | Age: 65
End: 2025-07-14
Payer: COMMERCIAL

## 2025-07-14 DIAGNOSIS — C61 PROSTATE CANCER (HCC): Primary | ICD-10-CM

## 2025-07-14 RX ORDER — TADALAFIL 20 MG/1
20 TABLET ORAL
Qty: 30 TABLET | Refills: 5 | Status: SHIPPED | OUTPATIENT
Start: 2025-07-14

## 2025-07-14 RX ORDER — TADALAFIL 5 MG/1
5 TABLET ORAL
Qty: 30 TABLET | Refills: 11 | Status: SHIPPED | OUTPATIENT
Start: 2025-07-14

## 2025-07-14 NOTE — PROGRESS NOTES
Urology Clinic Note    Primary Care Provider:  Ariel Trimble DO     Chief Complaint:   Prostate cancer     HPI & Assessment:   Jaylon Montano is a 65 year old male with history of DM2 and BPH/LUTS.  He had an elevated PSA up to 4.2.     Prostate MRI on 12/13/2024 showed volume 26 cc, 1.6 cm PI-RADS 4 lesion with broad capsular contact in the left PZ mid gland/apex.     I performed MRI fusion prostate biopsy on 1/28/2025.  Pathology showed Duarte 4+3 prostate cancer in the right mid gland and region of interest, Ruddy 3+4 prostate cancer in the left base and right base, Duarte 3+3 in the right apex and left mid.     PSMA PET scan 2/19/2025 showed indeterminant pulmonary nodules up to 6 mm (not PET avid), no signs of metastatic disease.     I brought him to the OR on 5/7/25 for robotic radical prostatectomy, bilateral pelvic lymphadenectomy. Pathology still pending.    Pathology shows Duarte 3+4 prostate cancer involving bilateral seminal vesicles, pT3b. Negative lymph nodes. Less than 3 mm focal positive margins.     Ultrasensitive PSA on 7/7/2025 was undetectable    Feeling well at this time.  No further pain.  Stress incontinence resolved after about 2 months, 0 pads per day currently.  No erections thus far, has been on Cialis 5 mg daily.    AUA symptom score is 6/3 with LUTS.    Plan:   - Repeat ultrasensitive PSA in 3 months  - Return to clinic in 3 months  - Continue Kegel exercises  - Cialis 5 mg daily  - Can trial Cialis 20 mg as needed       PSA:  Lab Results   Component Value Date    PSA 4.2 (H) 07/19/2024    PSA 1.52 01/22/2019    PSA 0.889 09/05/2013    PSAS 1.72 12/20/2017    PSAS 1.40 08/09/2016    QPSA 3.13 12/16/2023    QPSA 2.92 09/26/2023    QPSA 2.01 08/26/2022    QPSA 2.11 10/20/2021    QPSA 1.7 10/31/2020    QPSA 1.9 10/14/2019        History:   Past Medical History[1]    Past Surgical History[2]    Family History[3]    Short Social Hx on File[4]    Medications (Active prior to  today's visit):  Current Medications[5]    Allergies:  Allergies[6]    Review of Systems:   A comprehensive 10-point review of systems was completed.  Pertinent positives and negatives are noted in the the HPI.    Physical Exam:   CONSTITUTIONAL: Well developed, well nourished, in no acute distress  NEUROLOGIC: Alert and oriented  HEAD: Normocephalic, atraumatic  EYES: Sclera non-icteric  ENT: Hearing intact, moist mucous membranes  NECK: No obvious goiter or masses  RESPIRATORY: Normal respiratory effort  SKIN: No evident rashes  ABDOMEN: Soft, non-tender, non-distended, well-healed lap incisions      In total, 30 minutes were spent on this patient encounter (including chart review, patient history, physical, counseling, documentation, and communication).    Pawel Gould MD  Staff Urologist  [facility]  Office: [phone]           [1]   Past Medical History:   Decorative tattoo    Prediabetes    Prostate cancer (HCC)    Type 2 diabetes mellitus without complication, without long-term current use of insulin (HCC)    Wears glasses   [2]   Past Surgical History:  Procedure Laterality Date    Colonoscopy  10/17/2018    Pandolfi    Colonoscopy      Us biopsy prostate (cpt=76942/04455)     [3]   Family History  Problem Relation Age of Onset    Heart Disorder Father     Hypertension Father     Diabetes Father     Hypertension Mother     Hypertension Brother     Colon Polyps Brother    [4]   Social History  Socioeconomic History    Marital status:     Number of children: 5   Tobacco Use    Smoking status: Never    Smokeless tobacco: Never   Vaping Use    Vaping status: Never Used   Substance and Sexual Activity    Alcohol use: Yes     Alcohol/week: 2.0 standard drinks of alcohol     Types: 2 Cans of beer per week     Comment: beer once a month    Drug use: No    Sexual activity: Yes     Partners: Female   Social History Narrative    , lives with wife    Has 5 daughters, 3 in [place]     Social  Drivers of Health     Food Insecurity: No Food Insecurity (5/7/2025)    NCSS - Food Insecurity     Worried About Running Out of Food in the Last Year: No     Ran Out of Food in the Last Year: No   Transportation Needs: No Transportation Needs (5/7/2025)    NCSS - Transportation     Lack of Transportation: No   Housing Stability: Not At Risk (5/7/2025)    NCSS - Housing/Utilities     Has Housing: Yes     Worried About Losing Housing: No     Unable to Get Utilities: No   [5]   Current Outpatient Medications   Medication Sig Dispense Refill    omeprazole 20 MG Oral Capsule Delayed Release Once daily prn 30 capsule 1    tadalafil (CIALIS) 5 MG Oral Tab Take 1 tablet (5 mg total) by mouth daily as needed for Erectile Dysfunction. 30 tablet 11    Polyethylene Glycol 3350 (MIRALAX) 17 g Oral Powd Pack Take 17 g by mouth daily as needed (Take to avoid constipation, especially if taking narcotic pain medications.). (Patient not taking: Reported on 7/14/2025) 20 packet 1    HYDROcodone-acetaminophen 5-325 MG Oral Tab Take 1 tablet by mouth every 4 (four) hours as needed for Pain. (Patient not taking: Reported on 7/14/2025) 10 tablet 0   [6]   Allergies  Allergen Reactions    Ace Inhibitors Coughing    Metformin DIARRHEA

## (undated) DEVICE — SUT COAT VCRL + 0 54IN ABSRB UD ANTIBACT

## (undated) DEVICE — ZZ-CONVERTED-TO-524825-ADHESIVE SKIN TOP FOR WND CLSR DERMBND ADV

## (undated) DEVICE — TIP COVER ACCESSORY

## (undated) DEVICE — GLOVE SUR 6 SENSICARE PI PIP CRM PWD F

## (undated) DEVICE — ABSORBABLE WOUND CLOSURE DEVICE: Brand: V-LOC 90

## (undated) DEVICE — DRAIN SUR 15FR L3/16IN DIA4.7MM SIL RND

## (undated) DEVICE — SEAL

## (undated) DEVICE — MONOPOLAR CURVED SCISSORS: Brand: ENDOWRIST

## (undated) DEVICE — SUT MCRYL 4-0 18IN PS-2 ABSRB UD 19MM 3/8 CIR

## (undated) DEVICE — INSUFFLATION NEEDLE TO ESTABLISH PNEUMOPERITONEUM.: Brand: INSUFFLATION NEEDLE

## (undated) DEVICE — PAD,ABDOMINAL,8"X7.5",ST,LF,20/BX: Brand: MEDLINE INDUSTRIES, INC.

## (undated) DEVICE — SUT COAT VCRL+ 0 27IN UR-6 ABSRB VLT ANTIBACT

## (undated) DEVICE — CATHETER URETH 18FR BLLN 5CC SIL ALLY W/ SIL

## (undated) DEVICE — VIOLET BRAIDED (POLYGLACTIN 910), SYNTHETIC ABSORBABLE SUTURE: Brand: COATED VICRYL

## (undated) DEVICE — AIRSEAL TRI-LUMEN LILTERED TUBE SET: Brand: AIRSEAL

## (undated) DEVICE — ANCHOR TISSUE RETRIEVAL SYSTEM, BAG SIZE 175 ML, PORT SIZE 10 MM: Brand: ANCHOR TISSUE RETRIEVAL SYSTEM

## (undated) DEVICE — SOLUTION IV 1000ML DIL ST H2O

## (undated) DEVICE — ARM DRAPE

## (undated) DEVICE — DAVINCI XI CLIP HEM O LOK LARGE PURPLE

## (undated) DEVICE — ROBOTIC UROLOGY PROSTATE: Brand: MEDLINE INDUSTRIES, INC.

## (undated) DEVICE — PROGRASP FORCEPS: Brand: ENDOWRIST

## (undated) DEVICE — BLADELESS OBTURATOR: Brand: WECK VISTA

## (undated) DEVICE — ABSORBABLE HEMOSTAT (OXIDIZED REGENERATED CELLULOSE): Brand: SURGICEL NU-KNIT

## (undated) DEVICE — EVACUATOR SUR 100CC SIL BLB WND

## (undated) DEVICE — TROCAR: Brand: KII FIOS FIRST ENTRY

## (undated) DEVICE — Device

## (undated) DEVICE — SUT ETHLN 2-0 18IN FS NABSRB BLK 26MM 3/8 CIR

## (undated) DEVICE — GLOVE SUR 7 SENSICARE PI PIP CRM PWD F

## (undated) DEVICE — LARGE NEEDLE DRIVER: Brand: ENDOWRIST

## (undated) DEVICE — LAPAROVUE VISIBILITY SYSTEM LAPAROSCOPIC SOLUTIONS: Brand: LAPAROVUE

## (undated) DEVICE — MARYLAND BIPOLAR FORCEPS: Brand: ENDOWRIST

## (undated) DEVICE — 40580 - THE PINK PAD - ADVANCED TRENDELENBURG POSITIONING KIT: Brand: 40580 - THE PINK PAD - ADVANCED TRENDELENBURG POSITIONING KIT

## (undated) DEVICE — COLUMN DRAPE

## (undated) DEVICE — GLOVE SUR 7.5 SENSICARE PI PIP GRN PWD F

## (undated) DEVICE — AIRSEAL 12 MM ACCESS PORT AND PALM GRIP OBTURATOR WITH BLADELESS OPTICAL TIP, 120 MM LENGTH: Brand: AIRSEAL

## (undated) DEVICE — GAUZE,SPONGE,USP,4"X4",12PLY,STRL,10/PK: Brand: MEDLINE INDUSTRIES, INC.

## (undated) NOTE — LETTER
[facility] ULTRASOUND  [address] [facility] [facility] [address]  [phone]  Authorization for Imaging Procedure  Date of Procedure: 01/28/25    I hereby authorize Dr. BEASLEY, my physician and his/her assistants (if applicable), which may include medical students, residents, and/or fellows, to perform the following procedure and administer such anesthesia as may be determined necessary by my physician: ULTRASOUND URONAV GUIDED  PROSTATE BIOPSY on Jaylon Cárdenas Dusty.   2.  I recognize that during the procedure, unforeseen conditions may necessitate additional or different procedures than those listed above. I, therefore, further authorize and request that the above-named physician, assistants, or designees perform such procedures as are, in their judgment, necessary and desirable.    3.  My physician has discussed prior to my procedure the potential benefits, risks and side effects of this procedure; the likelihood of achieving goals; and potential problems that might occur during recuperation. They also discussed reasonable alternatives to the procedure, including risks, benefits, and side effects related to the alternatives and risks related to not receiving this procedure. I have had all my questions answered and I acknowledge that no guarantee has been made as to the result that may be obtained.    4.  Should the need arise during my procedure, which includes change of level of care prior to discharge, I also consent to the administration of blood and/or blood products. Further, I understand that despite careful testing and screening of blood or blood products by collecting agencies, I may still be subject to ill effects as a result of receiving a blood transfusion and/or blood products. The following are some, but not all, of the potential risks that can occur: fever and allergic reactions, hemolytic reactions, transmission of diseases such as Hepatitis, AIDS and Cytomegalovirus (CMV) and fluid  overload. In the event that I wish to have an autologous transfusion of my own blood, or a directed donor transfusion, I will discuss this with my physician.  Check only if Refusing Blood or Blood Products  I understand refusal of blood or blood products as deemed necessary by my physician may have serious consequences to my condition to include possible death. I hereby assume responsibility for my refusal and release the hospital, its personnel, and my physicians from any responsibility for the consequences of my refusal.   [  ] Patient Refuses Blood      5.  I authorize the use of any specimen, organs, tissues, body parts or foreign objects that may be removed from my body during the procedure for diagnosis, research or teaching purposes and their subsequent disposal by hospital authorities. I also authorize the release of specimen test results and/or written reports to my treating physician on the hospital medical staff or other referring or consulting physicians involved in my care, at the discretion of the Pathologist or my treating physician.    6.  I consent to the photographing or videotaping of the procedures to be performed, including appropriate portions of my body for medical, scientific, or educational purposes, provided my identity is not revealed by the pictures or by descriptive texts accompanying them. If the procedure has been photographed/videotaped, the physician will obtain the original picture, image, videotape or CD. The hospital will not be responsible for storage, release or maintenance of the picture, image, tape or CD.   7.  I consent to the presence of a  or observers in the operating room as deemed necessary by my physician or their designees.    8.  I recognize that in the event my procedure results in extended X-Ray/fluoroscopy time, I may develop a skin reaction.    9.  If I have a Do Not Attempt Resuscitation (DNAR) order in place, that status will be suspended  while in the operating room, procedural suite, and during the recovery period unless otherwise explicitly stated by me (or a person authorized to consent on my behalf). The performing physician or my attending physician will determine when the applicable recovery period ends for purposes of reinstating the DNAR order.  10.  I acknowledge that my physician has explained sedation/analgesia administration to me including the risk and benefits I consent to the administration of sedation/analgesia as may be necessary or desirable in the judgment of my physician.      I CERTIFY THAT I HAVE READ AND FULLY UNDERSTAND THE ABOVE CONSENT FOR THE PROCEDURE.   Signature of Patient: _____________________________________________________________  Responsible person in case of minor, unconscious: ____________________________________  Relationship to patient:  __________________________________________________________  Signature of Witness: _______________________________Date: _________Time: __________    Statement of Physician: My signature below affirms that prior to the time of the procedure, I have explained to the patient and/or his guardian, the risks and benefits involved in the proposed treatment and any reasonable alternative to the proposed treatment. I have also explained the risks and benefits involved in the refusal of the proposed treatment and have answered the patient's questions. If I have a significant financial interest in a co-management agreement or a significant financial interest in any product or implant, or other significant relationship used in the procedure/surgery, I have disclosed this and had a discussion with my patient.  Signature of Physician:   _________________________________Date:_____________Time:________    Patient Name: Jaylon Montano : 3/13/1960   Printed: 2025  Medical Record #: [id number]

## (undated) NOTE — LETTER
07/08/20        [address]      Dear Teri Hyatt,    [facility] records indicate that you have outstanding lab work and or testing that was ordered for you and has not yet been completed:  Orders Placed This Enc

## (undated) NOTE — MR AVS SNAPSHOT
After Visit Summary   4/6/2021    Mirella Portillo    MRN: [id number]           Visit Information     Date & Time  4/6/2021  1:00 PM Provider  Myriam Nguyen, [address] [place] [address] Neurodiagnostics Dept.  Phone  [phone]      A VISITS  Visit face-to-face with a [facility] physician or   SHARAN using your mobile device or computer   using [employer].    e-VISITS  Communicate with a [facility] Physician or SHARAN online. The physician will respond and provide   a treatment plan within a few hours.  ONLINE